# Patient Record
Sex: MALE | Race: BLACK OR AFRICAN AMERICAN | Employment: OTHER
[De-identification: names, ages, dates, MRNs, and addresses within clinical notes are randomized per-mention and may not be internally consistent; named-entity substitution may affect disease eponyms.]

---

## 2022-03-18 PROBLEM — R33.9 URINE RETENTION: Status: ACTIVE | Noted: 2019-03-08

## 2022-03-19 PROBLEM — N39.0 URINARY TRACT INFECTION WITHOUT HEMATURIA: Status: ACTIVE | Noted: 2020-01-02

## 2023-02-24 ENCOUNTER — HOSPITAL ENCOUNTER (EMERGENCY)
Facility: HOSPITAL | Age: 68
Discharge: HOME | End: 2023-02-24
Attending: EMERGENCY MEDICINE
Payer: MEDICAID

## 2023-02-24 VITALS
HEIGHT: 70 IN | RESPIRATION RATE: 18 BRPM | DIASTOLIC BLOOD PRESSURE: 78 MMHG | OXYGEN SATURATION: 96 % | SYSTOLIC BLOOD PRESSURE: 132 MMHG | HEART RATE: 72 BPM | WEIGHT: 160 LBS | BODY MASS INDEX: 22.9 KG/M2 | TEMPERATURE: 99.2 F

## 2023-02-24 DIAGNOSIS — R33.9 URINARY RETENTION: ICD-10-CM

## 2023-02-24 DIAGNOSIS — R31.0 GROSS HEMATURIA: Primary | ICD-10-CM

## 2023-02-24 PROCEDURE — 51702 INSERT TEMP BLADDER CATH: CPT | Performed by: EMERGENCY MEDICINE

## 2023-02-24 PROCEDURE — 99283 EMERGENCY DEPT VISIT LOW MDM: CPT

## 2023-02-24 NOTE — ED NOTES
EMERGENCY DEPARTMENT HISTORY AND PHYSICAL EXAM      Date: 2/24/2023  Patient Name: Toya Guerrero    History of Presenting Illness     No chief complaint on file. History Provided By: EMS    This is a 62-year-old male past medical history of urinary retention, hypertension, stroke presents to the ED with blood in his urinary catheter that was placed at a [place] [address] location. Per EMS patient was at the facility to have a urinary catheter inserted for urinary retention. EMS reports that staff communicated that there is resistance upon inserting the urinary catheter, resistance was pushed through and blood was noted to be within the urinary catheter. The facility then called EMS to have the patient transported to the ED. Patient is normally altered, he is alert and oriented to day and name. Patient denies any symptoms at this time, but ROS is unable to be performed due to patient's baseline AMS. The history is provided by the EMS personnel. PCP: No primary care provider on file. No current facility-administered medications for this encounter. No current outpatient medications on file.        Past History     Past Medical History:  Past Medical History:   Diagnosis Date    LORETTA (acute kidney injury) ([address]     Back pain     Bimalleolar fracture of right ankle 07/25/2018    CKD (chronic kidney disease)     Closed fracture of right ankle 11/26/2018    Essential hypertension 12/11/2015    Gastroesophageal reflux disease without esophagitis 12/11/2015    History of hemorrhagic stroke with residual hemiparesis ([address] 09/21/2014    Non-adherence to medical treatment     Non-adherence to medical treatment     Potassium (K) deficiency 11/26/2018    Sepsis ([address]     Urinary retention     Urinary tract infection without hematuria     Vision abnormalities      (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       Past Surgical History:  Past Surgical History:   Procedure Laterality Date    OTHER SURGICAL HISTORY  10/09/2014    INSERTION CPT: 51426;  Surgeon: Lucina Larose MD;  Location: [facility]; Service: Neurosurgery; Laterality: Right;  VENTRICULOPERITONEAL SHUNT INSERTION    OTHER SURGICAL HISTORY      Pr place percut gastrostomy    OTHER SURGICAL HISTORY Right 10/09/2014    Ventriculoperitoneal Shunt Insertion -- Dr Bambi Gale [facility])    OTHER SURGICAL HISTORY N/A 10/16/2014    Endoscopic Percutaneous Gastronomy Tube insertion -- Dr Lenard Rosario [facility])       Family History:  Family History   Problem Relation Age of Onset    Stroke Brother     Hypertension Mother     Stroke Mother     Diabetes Sister     Hypertension Sister     Diabetes Brother     Diabetes Mother     Hypertension Brother        Social History:  Social History     Tobacco Use    Smoking status: Former     Packs/day: 1.00     Types: Cigarettes    Smokeless tobacco: Never   Substance Use Topics    Alcohol use: Yes     Alcohol/week: 2.0 standard drinks    Drug use: No       Allergies:  Not on File      Review of Systems     Review of Systems   Reason unable to perform ROS: Baseline AMS. Physical Exam   There were no vitals taken for this visit. Physical Exam  Vitals and nursing note reviewed. Constitutional:       General: He is not in acute distress. Appearance: He is not ill-appearing, toxic-appearing or diaphoretic. HENT:      Head: Normocephalic and atraumatic. Nose: Nose normal. No congestion or rhinorrhea. Mouth/Throat:      Mouth: Mucous membranes are dry. Pharynx: Oropharynx is clear. Eyes:      General: No scleral icterus. Conjunctiva/sclera: Conjunctivae normal.      Pupils: Pupils are equal, round, and reactive to light. Neck:      Vascular: No carotid bruit. Cardiovascular:      Rate and Rhythm: Normal rate. Heart sounds: No murmur heard. Pulmonary:      Effort: Pulmonary effort is normal. No respiratory distress. Breath sounds: Normal breath sounds. No stridor.  No wheezing or rhonchi. Abdominal:      General: Abdomen is flat. There is no distension. Palpations: Abdomen is soft. Tenderness: There is no abdominal tenderness. There is no guarding or rebound. Genitourinary:     Comments: Urinary catheter is placed at the urethral opening which appears to be a hypospadias. There is blood around the urethral opening and within the patient's diaper. There is also gross blood noted within the urinary catheter bag. Musculoskeletal:      Cervical back: Normal range of motion. No tenderness. Comments: Right arm and right leg are fixed in flexion. Skin:     General: Skin is warm and dry. Capillary Refill: Capillary refill takes less than 2 seconds. Coloration: Skin is not pale. Neurological:      Mental Status: He is alert. Mental status is at baseline. Diagnostic Study Results     Labs -  No results found for this or any previous visit (from the past 12 hour(s)). Radiologic Studies -   No orders to display           Medical Decision Making   I am the first provider for this patient. I reviewed the vital signs, available nursing notes, past medical history, past surgical history, family history and social history. Vital Signs-Reviewed the patient's vital signs. ED Course: Progress Notes, Reevaluation, and Consults:    Provider Notes (Medical Decision Making):       MDM  Number of Diagnoses or Management Options  Diagnosis management comments: This is a 81-year-old male with past medical history of stroke, hypertension, altered mental status, urinary retention who presents to the ED after EMS picked him up from a [facility] procedural office that inserted a urinary catheter to treat his urinary retention.   EMS reports that there was resistance met when the catheter was inserted by faculty at this location and was pushed through however this resulted in gross blood being produced within the urinary catheter bag as well as around the urethral opening. On physical exam the patient is nontender to the anterior abdomen as well as the suprapubic region. He is noted to have a hypospadias with blood around the urinary catheter insertion site. Concern for traumatic insertion of urinary catheter. Ddx: Traumatic insertion of urinary catheter, UTI, hematuria of other source. Procedures          Diagnosis     Clinical Impression: No diagnosis found.            Jeniffer Hong  02/24/23 [id number]

## 2023-02-24 NOTE — ED TRIAGE NOTES
Patient came EMS d/t urinary cath issue    Had a friedman placed 30 min ago and was forced in per EMS.      12 fr in place and blood clots noted in bag

## 2023-02-24 NOTE — ED PROVIDER NOTES
EMERGENCY DEPARTMENT HISTORY AND PHYSICAL EXAM      Date: 2/24/2023  Patient Name: Azra Mari    History of Presenting Illness     Chief Complaint   Patient presents with    Urinary Catheter       History Provided By: EMS    This is a 52-year-old man who presents to the emergency department by EMS due to Malin catheter complication. He apparently was taken to an outpatient office to have a Malin catheter changed, when changing catheter, the staff apparently encountered resistance, push past it and then the patient started having blood from the catheter. It was a 12-gauge catheter they were using at the time. Patient has a history of urinary retention, chronic Malin use. There are no reports of recent fevers, patient is unable to provide much history due to dementia. The history is provided by the EMS personnel. PCP: None None    No current facility-administered medications for this encounter. No current outpatient medications on file. Past History     Past Medical History:  Past Medical History:   Diagnosis Date    LORETTA (acute kidney injury) ([address]     Back pain     Bimalleolar fracture of right ankle 07/25/2018    CKD (chronic kidney disease)     Closed fracture of right ankle 11/26/2018    Essential hypertension 12/11/2015    Gastroesophageal reflux disease without esophagitis 12/11/2015    History of hemorrhagic stroke with residual hemiparesis ([address] 09/21/2014    Non-adherence to medical treatment     Non-adherence to medical treatment     Potassium (K) deficiency 11/26/2018    Sepsis ([address]     Urinary retention     Urinary tract infection without hematuria     Vision abnormalities      (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       Past Surgical History:  Past Surgical History:   Procedure Laterality Date    OTHER SURGICAL HISTORY  10/09/2014    INSERTION CPT: 58379;  Surgeon: Michael Trimble MD;  Location: [facility]; Service: Neurosurgery;   Laterality: Right; VENTRICULOPERITONEAL SHUNT INSERTION    OTHER SURGICAL HISTORY      Pr place percut gastrostomy    OTHER SURGICAL HISTORY Right 10/09/2014    Ventriculoperitoneal Shunt Insertion -- Dr Liang Tavares [facility])    OTHER SURGICAL HISTORY N/A 10/16/2014    Endoscopic Percutaneous Gastronomy Tube insertion -- Dr Nicole Coronado [facility])       Family History:  Family History   Problem Relation Age of Onset    Stroke Brother     Hypertension Mother     Stroke Mother     Diabetes Sister     Hypertension Sister     Diabetes Brother     Diabetes Mother     Hypertension Brother        Social History:  Social History     Tobacco Use    Smoking status: Former     Packs/day: 1.00     Types: Cigarettes    Smokeless tobacco: Never   Substance Use Topics    Alcohol use: Yes     Alcohol/week: 2.0 standard drinks    Drug use: No       Allergies:  No Known Allergies      Review of Systems       Review of Systems   Constitutional:  Negative for chills, fatigue and fever. Genitourinary:  Positive for hematuria. Negative for dysuria and flank pain. Physical Exam   BP (!) 142/84   Pulse 72   Temp 99.2 °F (37.3 °C) (Oral)   Resp 18   Ht 5' 10\" (1.778 m)   Wt 160 lb (72.6 kg)   SpO2 99%   BMI 22.96 kg/m²       Physical Exam  Vitals and nursing note reviewed. Constitutional:       Appearance: He is normal weight. Cardiovascular:      Rate and Rhythm: Normal rate and regular rhythm. Pulmonary:      Effort: Pulmonary effort is normal.      Breath sounds: Normal breath sounds. Abdominal:      General: Bowel sounds are normal.      Palpations: Abdomen is soft. Genitourinary:     Comments: Patient has hypospadias secondary to catheter erosion, there is scant blood at the meatus. New catheter inserted with minimal difficulty, draining slightly blood-tinged urine. Neurological:      Mental Status: He is alert.          Diagnostic Study Results     Labs -  No results found for this or any previous visit (from the past 12 hour(s)). Radiologic Studies -   No orders to display           Medical Decision Making   I am the first provider for this patient. I reviewed the vital signs, available nursing notes, past medical history, past surgical history, family history and social history. Vital Signs-Reviewed the patient's vital signs. EKG:     ED Course: Progress Notes, Reevaluation, and Consults:    Provider Notes (Medical Decision Making):       MDM  Number of Diagnoses or Management Options  Gross hematuria  Urinary retention  Diagnosis management comments: 20-year-old man presenting to the emergency ferment with hematuria after a failed catheter insertion as an outpatient. We will replace his catheter here, monitor urine output, as long as he does not appear to be clotting, will discharge. Amount and/or Complexity of Data Reviewed  Clinical lab tests: ordered and reviewed  Discussion of test results with the performing providers: no  Obtain history from someone other than the patient: no  Discuss the patient with other providers: no    Risk of Complications, Morbidity, and/or Mortality  Presenting problems: low  Diagnostic procedures: low  Management options: low                 Procedures          Diagnosis     Clinical Impression:   1. Gross hematuria    2. Urinary retention        Disposition: home    @[facility]@     @[facility]@  Disclaimer: Sections of this note are dictated using utilizing voice recognition software. Minor typographical errors may be present. If questions arise, please do not hesitate to contact me or call our department.           Cindy Flores MD  02/24/23 0908

## 2023-03-24 PROBLEM — J96.01 ACUTE RESPIRATORY FAILURE WITH HYPOXEMIA (HCC): Status: ACTIVE | Noted: 2022-11-23

## 2023-03-24 PROBLEM — F33.41 RECURRENT MAJOR DEPRESSIVE DISORDER, IN PARTIAL REMISSION (HCC): Status: ACTIVE | Noted: 2022-04-20

## 2023-03-24 PROBLEM — K59.00 CONSTIPATION: Status: ACTIVE | Noted: 2019-04-03

## 2023-03-24 PROBLEM — N40.0 BPH (BENIGN PROSTATIC HYPERPLASIA): Status: ACTIVE | Noted: 2022-01-13

## 2023-03-24 PROBLEM — N30.00 ACUTE CYSTITIS WITHOUT HEMATURIA: Status: ACTIVE | Noted: 2018-09-13

## 2023-03-24 PROBLEM — M54.9 BACKACHE: Status: ACTIVE | Noted: 2017-01-18

## 2023-03-24 PROBLEM — I69.351 HEMIPARESIS AFFECTING RIGHT SIDE AS LATE EFFECT OF STROKE (HCC): Status: ACTIVE | Noted: 2022-04-20

## 2023-03-24 PROBLEM — J30.89 ENVIRONMENTAL AND SEASONAL ALLERGIES: Status: ACTIVE | Noted: 2019-04-03

## 2023-03-24 PROBLEM — G89.29 CHRONIC NECK PAIN: Status: ACTIVE | Noted: 2019-04-03

## 2023-03-24 PROBLEM — N17.8 OTHER ACUTE KIDNEY FAILURE (HCC): Status: ACTIVE | Noted: 2022-11-23

## 2023-03-24 PROBLEM — N17.9 AKI (ACUTE KIDNEY INJURY) (HCC): Status: ACTIVE | Noted: 2022-11-24

## 2023-03-24 PROBLEM — E78.49 OTHER HYPERLIPIDEMIA: Status: ACTIVE | Noted: 2022-01-13

## 2023-03-24 PROBLEM — E87.29 HIGH ANION GAP METABOLIC ACIDOSIS: Status: ACTIVE | Noted: 2022-11-24

## 2023-03-24 PROBLEM — A41.9 SEPTIC SHOCK (HCC): Status: ACTIVE | Noted: 2022-11-23

## 2023-03-24 PROBLEM — R65.21 SEPTIC SHOCK (HCC): Status: ACTIVE | Noted: 2022-11-23

## 2023-03-24 PROBLEM — R73.03 PREDIABETES: Status: ACTIVE | Noted: 2019-04-12

## 2023-03-24 PROBLEM — G47.9 SLEEP DIFFICULTIES: Status: ACTIVE | Noted: 2018-11-26

## 2023-03-24 PROBLEM — D72.9 NEUTROPHILIC LEUKOCYTOSIS: Status: ACTIVE | Noted: 2022-11-23

## 2023-03-24 PROBLEM — M54.2 CHRONIC NECK PAIN: Status: ACTIVE | Noted: 2019-04-03

## 2024-06-12 ENCOUNTER — APPOINTMENT (OUTPATIENT)
Facility: HOSPITAL | Age: 69
DRG: 698 | End: 2024-06-12
Payer: MEDICARE

## 2024-06-12 ENCOUNTER — HOSPITAL ENCOUNTER (INPATIENT)
Facility: HOSPITAL | Age: 69
LOS: 9 days | Discharge: NURSING, REHAB OR LONG-TERM CARE | DRG: 698 | End: 2024-06-21
Attending: STUDENT IN AN ORGANIZED HEALTH CARE EDUCATION/TRAINING PROGRAM | Admitting: INTERNAL MEDICINE
Payer: MEDICARE

## 2024-06-12 DIAGNOSIS — A41.9 SEPTIC SHOCK (HCC): Primary | ICD-10-CM

## 2024-06-12 DIAGNOSIS — K92.2 UPPER GI BLEED: ICD-10-CM

## 2024-06-12 DIAGNOSIS — R41.82 ALTERED MENTAL STATUS, UNSPECIFIED ALTERED MENTAL STATUS TYPE: ICD-10-CM

## 2024-06-12 DIAGNOSIS — R65.21 SEPTIC SHOCK (HCC): Primary | ICD-10-CM

## 2024-06-12 DIAGNOSIS — E87.20 LACTIC ACIDEMIA: ICD-10-CM

## 2024-06-12 LAB
ALBUMIN SERPL-MCNC: 2.5 G/DL (ref 3.4–5)
ALBUMIN/GLOB SERPL: 0.5 (ref 0.8–1.7)
ALP SERPL-CCNC: 54 U/L (ref 45–117)
ALT SERPL-CCNC: 11 U/L (ref 16–61)
AMPHET UR QL SCN: NEGATIVE
ANION GAP SERPL CALC-SCNC: 15 MMOL/L (ref 3–18)
APPEARANCE UR: ABNORMAL
AST SERPL-CCNC: 10 U/L (ref 10–38)
BACTERIA URNS QL MICRO: ABNORMAL /HPF
BARBITURATES UR QL SCN: NEGATIVE
BENZODIAZ UR QL: NEGATIVE
BILIRUB SERPL-MCNC: 0.2 MG/DL (ref 0.2–1)
BILIRUB UR QL: NEGATIVE
BUN SERPL-MCNC: 99 MG/DL (ref 7–18)
BUN/CREAT SERPL: 45 (ref 12–20)
CALCIUM SERPL-MCNC: 8.3 MG/DL (ref 8.5–10.1)
CANNABINOIDS UR QL SCN: NEGATIVE
CHLORIDE SERPL-SCNC: 110 MMOL/L (ref 100–111)
CK SERPL-CCNC: 33 U/L (ref 39–308)
CO2 SERPL-SCNC: 18 MMOL/L (ref 21–32)
COCAINE UR QL SCN: NEGATIVE
COLOR UR: YELLOW
CREAT SERPL-MCNC: 2.21 MG/DL (ref 0.6–1.3)
EPITH CASTS URNS QL MICRO: ABNORMAL /LPF (ref 0–5)
ERYTHROCYTE [DISTWIDTH] IN BLOOD BY AUTOMATED COUNT: 14.7 % (ref 11.6–14.5)
ETHANOL SERPL-MCNC: <3 MG/DL (ref 0–3)
FLUAV RNA SPEC QL NAA+PROBE: NOT DETECTED
FLUBV RNA SPEC QL NAA+PROBE: NOT DETECTED
GLOBULIN SER CALC-MCNC: 4.6 G/DL (ref 2–4)
GLUCOSE BLD STRIP.AUTO-MCNC: 192 MG/DL (ref 70–110)
GLUCOSE SERPL-MCNC: 184 MG/DL (ref 74–99)
GLUCOSE UR STRIP.AUTO-MCNC: NEGATIVE MG/DL
HCT VFR BLD AUTO: 15.7 % (ref 36–48)
HCT VFR BLD AUTO: 16.9 % (ref 36–48)
HEMOCCULT STL QL: POSITIVE
HGB BLD-MCNC: 5 G/DL (ref 13–16)
HGB BLD-MCNC: 5.8 G/DL (ref 13–16)
HGB UR QL STRIP: ABNORMAL
HISTORY CHECK: NORMAL
HISTORY CHECK: NORMAL
KETONES UR QL STRIP.AUTO: ABNORMAL MG/DL
LACTATE SERPL-SCNC: 0.8 MMOL/L (ref 0.4–2)
LACTATE SERPL-SCNC: 6.1 MMOL/L (ref 0.4–2)
LEUKOCYTE ESTERASE UR QL STRIP.AUTO: ABNORMAL
Lab: NORMAL
MCH RBC QN AUTO: 28.1 PG (ref 24–34)
MCHC RBC AUTO-ENTMCNC: 31.8 G/DL (ref 31–37)
MCV RBC AUTO: 88.2 FL (ref 78–100)
METHADONE UR QL: NEGATIVE
NITRITE UR QL STRIP.AUTO: NEGATIVE
NRBC # BLD: 0 K/UL (ref 0–0.01)
NRBC BLD-RTO: 0 PER 100 WBC
OPIATES UR QL: NEGATIVE
PCP UR QL: NEGATIVE
PH UR STRIP: 5 (ref 5–8)
PLATELET # BLD AUTO: 416 K/UL (ref 135–420)
PMV BLD AUTO: 10.2 FL (ref 9.2–11.8)
POTASSIUM SERPL-SCNC: 4.1 MMOL/L (ref 3.5–5.5)
PROCALCITONIN SERPL-MCNC: 1.17 NG/ML
PROT SERPL-MCNC: 7.1 G/DL (ref 6.4–8.2)
PROT UR STRIP-MCNC: 100 MG/DL
RBC # BLD AUTO: 1.78 M/UL (ref 4.35–5.65)
RBC #/AREA URNS HPF: ABNORMAL /HPF (ref 0–5)
SARS-COV-2 RNA RESP QL NAA+PROBE: NOT DETECTED
SODIUM SERPL-SCNC: 143 MMOL/L (ref 136–145)
SP GR UR REFRACTOMETRY: 1.02 (ref 1–1.03)
TROPONIN I SERPL HS-MCNC: 8 NG/L (ref 0–78)
TSH SERPL DL<=0.05 MIU/L-ACNC: 3.35 UIU/ML (ref 0.36–3.74)
UROBILINOGEN UR QL STRIP.AUTO: 1 EU/DL (ref 0.2–1)
WBC # BLD AUTO: 19.6 K/UL (ref 4.6–13.2)
WBC URNS QL MICRO: ABNORMAL /HPF (ref 0–4)
YEAST URNS QL MICRO: ABNORMAL

## 2024-06-12 PROCEDURE — 84145 PROCALCITONIN (PCT): CPT

## 2024-06-12 PROCEDURE — 02HV33Z INSERTION OF INFUSION DEVICE INTO SUPERIOR VENA CAVA, PERCUTANEOUS APPROACH: ICD-10-PCS | Performed by: STUDENT IN AN ORGANIZED HEALTH CARE EDUCATION/TRAINING PROGRAM

## 2024-06-12 PROCEDURE — 2500000003 HC RX 250 WO HCPCS: Performed by: REGISTERED NURSE

## 2024-06-12 PROCEDURE — 96366 THER/PROPH/DIAG IV INF ADDON: CPT

## 2024-06-12 PROCEDURE — 84443 ASSAY THYROID STIM HORMONE: CPT

## 2024-06-12 PROCEDURE — 85027 COMPLETE CBC AUTOMATED: CPT

## 2024-06-12 PROCEDURE — P9016 RBC LEUKOCYTES REDUCED: HCPCS

## 2024-06-12 PROCEDURE — 86850 RBC ANTIBODY SCREEN: CPT

## 2024-06-12 PROCEDURE — 2580000003 HC RX 258: Performed by: PHYSICIAN ASSISTANT

## 2024-06-12 PROCEDURE — 82962 GLUCOSE BLOOD TEST: CPT

## 2024-06-12 PROCEDURE — 82550 ASSAY OF CK (CPK): CPT

## 2024-06-12 PROCEDURE — 81001 URINALYSIS AUTO W/SCOPE: CPT

## 2024-06-12 PROCEDURE — 71045 X-RAY EXAM CHEST 1 VIEW: CPT

## 2024-06-12 PROCEDURE — 94761 N-INVAS EAR/PLS OXIMETRY MLT: CPT

## 2024-06-12 PROCEDURE — 85014 HEMATOCRIT: CPT

## 2024-06-12 PROCEDURE — P9047 ALBUMIN (HUMAN), 25%, 50ML: HCPCS | Performed by: REGISTERED NURSE

## 2024-06-12 PROCEDURE — 87086 URINE CULTURE/COLONY COUNT: CPT

## 2024-06-12 PROCEDURE — 85018 HEMOGLOBIN: CPT

## 2024-06-12 PROCEDURE — 70450 CT HEAD/BRAIN W/O DYE: CPT

## 2024-06-12 PROCEDURE — 82077 ASSAY SPEC XCP UR&BREATH IA: CPT

## 2024-06-12 PROCEDURE — 6360000002 HC RX W HCPCS: Performed by: STUDENT IN AN ORGANIZED HEALTH CARE EDUCATION/TRAINING PROGRAM

## 2024-06-12 PROCEDURE — 84484 ASSAY OF TROPONIN QUANT: CPT

## 2024-06-12 PROCEDURE — 87636 SARSCOV2 & INF A&B AMP PRB: CPT

## 2024-06-12 PROCEDURE — 86901 BLOOD TYPING SEROLOGIC RH(D): CPT

## 2024-06-12 PROCEDURE — 96375 TX/PRO/DX INJ NEW DRUG ADDON: CPT

## 2024-06-12 PROCEDURE — 86900 BLOOD TYPING SEROLOGIC ABO: CPT

## 2024-06-12 PROCEDURE — 87186 SC STD MICRODIL/AGAR DIL: CPT

## 2024-06-12 PROCEDURE — 2000000000 HC ICU R&B

## 2024-06-12 PROCEDURE — 96365 THER/PROPH/DIAG IV INF INIT: CPT

## 2024-06-12 PROCEDURE — 2700000000 HC OXYGEN THERAPY PER DAY

## 2024-06-12 PROCEDURE — 51702 INSERT TEMP BLADDER CATH: CPT

## 2024-06-12 PROCEDURE — 96361 HYDRATE IV INFUSION ADD-ON: CPT

## 2024-06-12 PROCEDURE — 80307 DRUG TEST PRSMV CHEM ANLYZR: CPT

## 2024-06-12 PROCEDURE — 87088 URINE BACTERIA CULTURE: CPT

## 2024-06-12 PROCEDURE — 74176 CT ABD & PELVIS W/O CONTRAST: CPT

## 2024-06-12 PROCEDURE — 6360000002 HC RX W HCPCS: Performed by: PHYSICIAN ASSISTANT

## 2024-06-12 PROCEDURE — 99285 EMERGENCY DEPT VISIT HI MDM: CPT

## 2024-06-12 PROCEDURE — 99291 CRITICAL CARE FIRST HOUR: CPT | Performed by: INTERNAL MEDICINE

## 2024-06-12 PROCEDURE — 6360000002 HC RX W HCPCS: Performed by: REGISTERED NURSE

## 2024-06-12 PROCEDURE — 83605 ASSAY OF LACTIC ACID: CPT

## 2024-06-12 PROCEDURE — 2580000003 HC RX 258: Performed by: STUDENT IN AN ORGANIZED HEALTH CARE EDUCATION/TRAINING PROGRAM

## 2024-06-12 PROCEDURE — 2500000003 HC RX 250 WO HCPCS: Performed by: STUDENT IN AN ORGANIZED HEALTH CARE EDUCATION/TRAINING PROGRAM

## 2024-06-12 PROCEDURE — C9113 INJ PANTOPRAZOLE SODIUM, VIA: HCPCS | Performed by: STUDENT IN AN ORGANIZED HEALTH CARE EDUCATION/TRAINING PROGRAM

## 2024-06-12 PROCEDURE — 3E033XZ INTRODUCTION OF VASOPRESSOR INTO PERIPHERAL VEIN, PERCUTANEOUS APPROACH: ICD-10-PCS | Performed by: STUDENT IN AN ORGANIZED HEALTH CARE EDUCATION/TRAINING PROGRAM

## 2024-06-12 PROCEDURE — 99291 CRITICAL CARE FIRST HOUR: CPT | Performed by: REGISTERED NURSE

## 2024-06-12 PROCEDURE — 82272 OCCULT BLD FECES 1-3 TESTS: CPT

## 2024-06-12 PROCEDURE — 86923 COMPATIBILITY TEST ELECTRIC: CPT

## 2024-06-12 PROCEDURE — 87040 BLOOD CULTURE FOR BACTERIA: CPT

## 2024-06-12 PROCEDURE — 80053 COMPREHEN METABOLIC PANEL: CPT

## 2024-06-12 RX ORDER — ONDANSETRON 4 MG/1
4 TABLET, ORALLY DISINTEGRATING ORAL EVERY 8 HOURS PRN
Status: DISCONTINUED | OUTPATIENT
Start: 2024-06-12 | End: 2024-06-21 | Stop reason: HOSPADM

## 2024-06-12 RX ORDER — SODIUM CHLORIDE, SODIUM LACTATE, POTASSIUM CHLORIDE, AND CALCIUM CHLORIDE .6; .31; .03; .02 G/100ML; G/100ML; G/100ML; G/100ML
1000 INJECTION, SOLUTION INTRAVENOUS ONCE
Status: COMPLETED | OUTPATIENT
Start: 2024-06-12 | End: 2024-06-12

## 2024-06-12 RX ORDER — OCTREOTIDE ACETATE 50 UG/ML
50 INJECTION, SOLUTION INTRAVENOUS; SUBCUTANEOUS ONCE
Status: COMPLETED | OUTPATIENT
Start: 2024-06-12 | End: 2024-06-12

## 2024-06-12 RX ORDER — ACETAMINOPHEN 325 MG/1
650 TABLET ORAL EVERY 6 HOURS PRN
Status: DISCONTINUED | OUTPATIENT
Start: 2024-06-12 | End: 2024-06-21 | Stop reason: HOSPADM

## 2024-06-12 RX ORDER — POLYETHYLENE GLYCOL 3350 17 G/17G
17 POWDER, FOR SOLUTION ORAL DAILY PRN
Status: DISCONTINUED | OUTPATIENT
Start: 2024-06-12 | End: 2024-06-12

## 2024-06-12 RX ORDER — MAGNESIUM SULFATE IN WATER 40 MG/ML
2000 INJECTION, SOLUTION INTRAVENOUS PRN
Status: DISCONTINUED | OUTPATIENT
Start: 2024-06-12 | End: 2024-06-21 | Stop reason: HOSPADM

## 2024-06-12 RX ORDER — ACETAMINOPHEN 650 MG/1
650 SUPPOSITORY RECTAL EVERY 6 HOURS PRN
Status: DISCONTINUED | OUTPATIENT
Start: 2024-06-12 | End: 2024-06-21 | Stop reason: HOSPADM

## 2024-06-12 RX ORDER — SODIUM CHLORIDE 9 MG/ML
INJECTION, SOLUTION INTRAVENOUS PRN
Status: DISCONTINUED | OUTPATIENT
Start: 2024-06-12 | End: 2024-06-21 | Stop reason: HOSPADM

## 2024-06-12 RX ORDER — ALBUMIN (HUMAN) 12.5 G/50ML
25 SOLUTION INTRAVENOUS EVERY 6 HOURS
Status: COMPLETED | OUTPATIENT
Start: 2024-06-12 | End: 2024-06-13

## 2024-06-12 RX ORDER — ONDANSETRON 2 MG/ML
4 INJECTION INTRAMUSCULAR; INTRAVENOUS EVERY 6 HOURS PRN
Status: DISCONTINUED | OUTPATIENT
Start: 2024-06-12 | End: 2024-06-21 | Stop reason: HOSPADM

## 2024-06-12 RX ORDER — 0.9 % SODIUM CHLORIDE 0.9 %
500 INTRAVENOUS SOLUTION INTRAVENOUS ONCE
Status: COMPLETED | OUTPATIENT
Start: 2024-06-12 | End: 2024-06-12

## 2024-06-12 RX ORDER — SODIUM CHLORIDE 0.9 % (FLUSH) 0.9 %
5-40 SYRINGE (ML) INJECTION EVERY 12 HOURS SCHEDULED
Status: DISCONTINUED | OUTPATIENT
Start: 2024-06-12 | End: 2024-06-21 | Stop reason: HOSPADM

## 2024-06-12 RX ORDER — SODIUM CHLORIDE 0.9 % (FLUSH) 0.9 %
5-40 SYRINGE (ML) INJECTION PRN
Status: DISCONTINUED | OUTPATIENT
Start: 2024-06-12 | End: 2024-06-21 | Stop reason: HOSPADM

## 2024-06-12 RX ORDER — SODIUM CHLORIDE 9 MG/ML
INJECTION, SOLUTION INTRAVENOUS PRN
Status: DISCONTINUED | OUTPATIENT
Start: 2024-06-12 | End: 2024-06-17

## 2024-06-12 RX ORDER — CALCIUM GLUCONATE 20 MG/ML
1000 INJECTION, SOLUTION INTRAVENOUS
Status: COMPLETED | OUTPATIENT
Start: 2024-06-12 | End: 2024-06-13

## 2024-06-12 RX ORDER — POTASSIUM CHLORIDE 20 MEQ/1
40 TABLET, EXTENDED RELEASE ORAL PRN
Status: DISCONTINUED | OUTPATIENT
Start: 2024-06-12 | End: 2024-06-21 | Stop reason: HOSPADM

## 2024-06-12 RX ORDER — POTASSIUM CHLORIDE 7.45 MG/ML
10 INJECTION INTRAVENOUS PRN
Status: DISCONTINUED | OUTPATIENT
Start: 2024-06-12 | End: 2024-06-21 | Stop reason: HOSPADM

## 2024-06-12 RX ORDER — NOREPINEPHRINE BITARTRATE 0.06 MG/ML
1-100 INJECTION, SOLUTION INTRAVENOUS CONTINUOUS
Status: DISCONTINUED | OUTPATIENT
Start: 2024-06-12 | End: 2024-06-15

## 2024-06-12 RX ORDER — SODIUM CHLORIDE 9 MG/ML
INJECTION, SOLUTION INTRAVENOUS PRN
Status: DISCONTINUED | OUTPATIENT
Start: 2024-06-12 | End: 2024-06-15

## 2024-06-12 RX ADMIN — VANCOMYCIN HYDROCHLORIDE 1750 MG: 10 INJECTION, POWDER, LYOPHILIZED, FOR SOLUTION INTRAVENOUS at 14:37

## 2024-06-12 RX ADMIN — ALBUMIN (HUMAN) 25 G: 0.25 INJECTION, SOLUTION INTRAVENOUS at 22:32

## 2024-06-12 RX ADMIN — CEFEPIME 2000 MG: 2 INJECTION, POWDER, FOR SOLUTION INTRAVENOUS at 12:59

## 2024-06-12 RX ADMIN — Medication 5 MCG/MIN: at 18:26

## 2024-06-12 RX ADMIN — OCTREOTIDE ACETATE 50 MCG: 50 INJECTION, SOLUTION INTRAVENOUS; SUBCUTANEOUS at 17:30

## 2024-06-12 RX ADMIN — PANTOPRAZOLE SODIUM 80 MG: 40 INJECTION, POWDER, FOR SOLUTION INTRAVENOUS at 17:30

## 2024-06-12 RX ADMIN — Medication 11 MCG/MIN: at 19:55

## 2024-06-12 RX ADMIN — PIPERACILLIN AND TAZOBACTAM 4500 MG: 4; .5 INJECTION, POWDER, FOR SOLUTION INTRAVENOUS at 20:15

## 2024-06-12 RX ADMIN — Medication 7 MCG/MIN: at 19:18

## 2024-06-12 RX ADMIN — CALCIUM GLUCONATE 1000 MG: 20 INJECTION, SOLUTION INTRAVENOUS at 22:27

## 2024-06-12 RX ADMIN — SODIUM CHLORIDE, POTASSIUM CHLORIDE, SODIUM LACTATE AND CALCIUM CHLORIDE 1000 ML: 600; 310; 30; 20 INJECTION, SOLUTION INTRAVENOUS at 13:09

## 2024-06-12 RX ADMIN — SODIUM CHLORIDE 500 ML: 9 INJECTION, SOLUTION INTRAVENOUS at 17:26

## 2024-06-12 RX ADMIN — SODIUM CHLORIDE, POTASSIUM CHLORIDE, SODIUM LACTATE AND CALCIUM CHLORIDE 1000 ML: 600; 310; 30; 20 INJECTION, SOLUTION INTRAVENOUS at 11:59

## 2024-06-12 ASSESSMENT — PAIN SCALES - GENERAL: PAINLEVEL_OUTOF10: 0

## 2024-06-12 NOTE — ED NOTES
Daughter present at bedside. Dr. Sanchez gave update to the daughter.    Daughter signed blood transfusion consent in behalf of the pt.

## 2024-06-12 NOTE — ED TRIAGE NOTES
Pt presents to ed bed 4 with c/o ams. Ems stated pt presented to adult  center more altered than normal. Stated pt normally talks but today was non-verbal. Pt has hx of dimentia, ckd, & jennifer     Pt presents as tachycardic and hypotensive     Bg via ems 236

## 2024-06-12 NOTE — FLOWSHEET NOTE
06/12/24 1801   Document PRIOR to Transfusion   Blood Bank Wristband ID # [id number]   Previous Transfusion Reaction Unknown   Pre-Meds Given No   Hospital Blood Product Consent Form Signed Yes   Vitals   BP (!) 72/42   Temp 99 °F (37.2 °C)   Temp Source Oral     Blood transfusion started at 1801

## 2024-06-12 NOTE — ED PROVIDER NOTES
EMERGENCY DEPARTMENT HISTORY AND PHYSICAL EXAM    2:26 PM      Date: 6/12/2024  Patient Name: Alycia Clement    History of Presenting Illness     Chief Complaint   Patient presents with    Altered Mental Status       History From: EMS and patient's daughter    Alycia Clement is a 69 y.o. male   HPI  69-year old male with history of urinary retention, hydrocephalus, CVA with mild right-sided deficits who presents with concern for altered mental status.  Unknown last known well.  Per EMS unknown last well.  Today patient is less responsive than baseline, increased work of breathing, was found to be hypotensive.  Spoke with daughter.  States that patient was in the hospital 2 weeks ago.  Unknown last known well.  Patient is able to have minimal conversations due to history of stroke.    Nursing Notes were all reviewed and agreed with or any disagreements were addressed in the HPI.    PCP: None, None    Current Facility-Administered Medications   Medication Dose Route Frequency Provider Last Rate Last Admin    0.9 % sodium chloride infusion   IntraVENous PRN Juany Ignacio PA-C        pantoprazole (PROTONIX) 40 mg in sodium chloride 0.9 % 50 mL (mini-bag) infusion  8 mg/hr IntraVENous Continuous Asah Hernandez T, DO 10 mL/hr at 06/18/24 0905 8 mg/hr at 06/18/24 0905    piperacillin-tazobactam (ZOSYN) 3,375 mg in dextrose 5 % 50 mL IVPB  3,375 mg IntraVENous Q8H Asah Hernandez T, DO 12.5 mL/hr at 06/18/24 1202 3,375 mg at 06/18/24 1202    glucose chewable tablet 16 g  4 tablet Oral PRN PedlowskaZenaida, PA-C        dextrose bolus 10% 125 mL  125 mL IntraVENous PRN Pedlowska, Zenaida, PA-C        Or    dextrose bolus 10% 250 mL  250 mL IntraVENous PRN Pedlowska, Zenaida, PA-C        Glucagon Emergency SOLR 1 mg  1 mg SubCUTAneous PRN Pedlowska, Zenaida, PA-C        dextrose 10 % infusion   IntraVENous Continuous PRN Pedlowska, Zenaida, PA-C        insulin lispro (HUMALOG,ADMELOG) injection vial 0-4 Units  0-4 Units SubCUTAneous Q6H

## 2024-06-12 NOTE — CONSULTS
[email or web address]  [phone]    GASTROENTEROLOGY CONSULT          Impression:     Alycia Clement is a 69 y.o. male w/ PMH CVA w/ R sided deficits, BPH w/ indwelling friedman, h/o  shunt, CKD, GERD presented to the ED 6/12 with AMS. Unknown last well. Hypotensive and tachycardic in ED w/ Hgb of 5.0 (baseline 8-10), PLT nml, Cr 2.21, lactic acid 6.1, LFT nml, FOB positive, and black stools. No evidence of liver disease on 4 CT in last 2-3 yrs. Patient was admitted to [facility] last month for septic shock w/ associated acute encephalopathy.     11/2022 EGD - non-bleeding gastric ulcer w/ no signs of recent bleed, duodenitis; gastric bx: reactive gastropathy, gastric ulcer: focally denuded       Low suspicion for variceal bleed at this time w/ nml PLT and imaging w/ nml liver in past.    Plan:     1. NPO after midnight. Pending clinical course and tomorrow's team consider EGD if indicated.   2. IV BID PPI  3. Monitor H/H, transfuse if Hgb <7.  4. Continue medical management per primary.     Chief Complaint: acute anemia       HPI:    Alycia Clement is a 69 y.o. male w/ PMH CVA w/ R sided deficits, BPH w/ indwelling friedman, h/o  shunt, CKD, GERD presented to the ED 6/12 with AMS. Unknown last well. Hypotensive and tachycardic in ED w/ Hgb of 5.0 (baseline 8-10), PLT nml, Cr 2.21, lactic acid 6.1, LFT nml, FOB positive, and black stools. No evidence of liver disease on 4 CT in last 2-3 yrs. Patient was admitted to [facility] last month for septic shock w/ associated acute encephalopathy.     11/2022 EGD - non-bleeding gastric ulcer w/ no signs of recent bleed, duodenitis; gastric bx: reactive gastropathy, gastric ulcer: focally denuded         PMH:   Past Medical History:   Diagnosis Date    LORETTA (acute kidney injury) (HCC)     Back pain     Bimalleolar fracture of right ankle 07/25/2018    CKD (chronic kidney disease)     Closed fracture of right ankle 11/26/2018    Essential hypertension 12/11/2015    Gastroesophageal reflux disease  Labs     06/12/24  1150   WBC 19.6*   RBC 1.78*   HGB 5.0*   HCT 15.7*   MCV 88.2   MCH 28.1   MCHC 31.8   RDW 14.7*      MPV 10.2    No results for input(s): \"MONO\", \"PRO\", \"METAS\" in the last 72 hours.    Invalid input(s): \"GRANS\", \"LYMPH\", \"EOS\", \"BASO\", \"MYELO\", \"BLAST\"     Hepatic Function   No results for input(s): \"TP\" in the last 72 hours.    Invalid input(s): \"ALB\", \"DBILI\", \"TBILI\", \"GPT\", \"SGOT\", \"AP\", \"AML\", \"LPSE\"     Coags   No results for input(s): \"INR\", \"APTT\" in the last 72 hours.    Invalid input(s): \"PTP\"        Vikram Stone PA-C.   06/12/24, 5:19 PM   [facility]  [email or web address]  Phone: [phone]

## 2024-06-12 NOTE — ED NOTES
Pt came to ED with friedman. Removed old friedman.    BRITANY Boss inserted Fr16 friedman aseptically and connected to UB.

## 2024-06-12 NOTE — PROGRESS NOTES
Pharmacy Note     Zosyn 3375 mg Every 8 hours ordered for treatment of Sepsis of Unknown Etiology for 7 days. Per [facility] Policy, Zosyn will be changed to Zosyn 4500 mg ONCE followed by 3375 mg Every 8 hours.     Estimated Creatinine Clearance: Estimated Creatinine Clearance: 33 mL/min (A) (based on SCr of 2.21 mg/dL (H)).  Dialysis Status, LORETTA, CKD: LORETTA    BMI:  Body mass index is 24.67 kg/m².    Rationale for Adjustment:  [facility] B-Lactam extended infusion policy    Pharmacy will continue to monitor and adjust dose as necessary.      Please call with any questions.    Thank you,  Tex Naik RPH

## 2024-06-12 NOTE — FLOWSHEET NOTE
06/12/24 1351   Document PRIOR to Transfusion   Blood Bank Wristband ID # [id number]   Previous Transfusion Reaction Unknown   Pre-Meds Given No   Hospital Blood Product Consent Form Signed Yes   Vitals   BP (!) 120/55   Temp 97.6 °F (36.4 °C)   Temp Source Oral   SpO2 100 %     Blood transfusion started

## 2024-06-12 NOTE — CONSENT
Informed Consent for Blood Component Transfusion Note    I have discussed with the daughter the rationale for blood component transfusion; its benefits in treating or preventing fatigue, organ damage, or death; and its risk which includes mild transfusion reactions, rare risk of blood borne infection, or more serious but rare reactions. I have discussed the alternatives to transfusion, including the risk and consequences of not receiving transfusion. The daughter had an opportunity to ask questions and had agreed to proceed with transfusion of blood components.    Electronically signed by Gaston Sanchez MD on 6/12/24 at 3:40 PM EDT

## 2024-06-13 ENCOUNTER — APPOINTMENT (OUTPATIENT)
Facility: HOSPITAL | Age: 69
DRG: 698 | End: 2024-06-13
Payer: MEDICARE

## 2024-06-13 PROBLEM — J96.01 ACUTE RESPIRATORY FAILURE WITH HYPOXIA (HCC): Status: ACTIVE | Noted: 2024-06-13

## 2024-06-13 PROBLEM — Z51.5 ENCOUNTER FOR PALLIATIVE CARE: Status: ACTIVE | Noted: 2024-06-13

## 2024-06-13 PROBLEM — G93.40 ACUTE ENCEPHALOPATHY: Status: ACTIVE | Noted: 2024-06-13

## 2024-06-13 PROBLEM — R53.81 DEBILITY: Status: ACTIVE | Noted: 2024-06-13

## 2024-06-13 PROBLEM — Z71.89 GOALS OF CARE, COUNSELING/DISCUSSION: Status: ACTIVE | Noted: 2024-06-13

## 2024-06-13 PROBLEM — N39.0 SEPTIC SHOCK DUE TO URINARY TRACT INFECTION (HCC): Status: ACTIVE | Noted: 2024-06-13

## 2024-06-13 PROBLEM — A41.9 SEPTIC SHOCK DUE TO URINARY TRACT INFECTION (HCC): Status: ACTIVE | Noted: 2024-06-13

## 2024-06-13 PROBLEM — Z92.89 HISTORY OF CREATION OF VENTRICULOPERITONEAL SHUNT: Status: ACTIVE | Noted: 2024-06-13

## 2024-06-13 PROBLEM — I69.30 HISTORY OF CVA WITH RESIDUAL DEFICIT: Status: ACTIVE | Noted: 2024-06-13

## 2024-06-13 PROBLEM — R57.8 HEMORRHAGIC SHOCK (HCC): Status: ACTIVE | Noted: 2024-06-13

## 2024-06-13 PROBLEM — D62 ACUTE BLOOD LOSS ANEMIA: Status: ACTIVE | Noted: 2024-06-13

## 2024-06-13 PROBLEM — R65.21 SEPTIC SHOCK DUE TO URINARY TRACT INFECTION (HCC): Status: ACTIVE | Noted: 2024-06-13

## 2024-06-13 LAB
ANION GAP BLD CALC-SCNC: ABNORMAL MMOL/L (ref 10–20)
ANION GAP BLD CALC-SCNC: ABNORMAL MMOL/L (ref 10–20)
ANION GAP SERPL CALC-SCNC: 8 MMOL/L (ref 3–18)
ARTERIAL PATENCY WRIST A: ABNORMAL
ARTERIAL PATENCY WRIST A: POSITIVE
BASE DEFICIT BLD-SCNC: 8.8 MMOL/L
BASE DEFICIT BLD-SCNC: 9.2 MMOL/L
BASOPHILS # BLD: 0 K/UL (ref 0–0.1)
BASOPHILS NFR BLD: 0 % (ref 0–2)
BDY SITE: ABNORMAL
BDY SITE: ABNORMAL
BUN SERPL-MCNC: 100 MG/DL (ref 7–18)
BUN/CREAT SERPL: 53 (ref 12–20)
CA-I BLD-MCNC: 1.11 MMOL/L (ref 1.12–1.32)
CA-I BLD-MCNC: 1.12 MMOL/L (ref 1.12–1.32)
CA-I SERPL-SCNC: 1.06 MMOL/L (ref 1.15–1.33)
CALCIUM SERPL-MCNC: 8.4 MG/DL (ref 8.5–10.1)
CHLORIDE BLD-SCNC: 119 MMOL/L (ref 98–107)
CHLORIDE BLD-SCNC: 121 MMOL/L (ref 98–107)
CHLORIDE SERPL-SCNC: 117 MMOL/L (ref 100–111)
CO2 BLD-SCNC: 17 MMOL/L (ref 19–24)
CO2 BLD-SCNC: 17 MMOL/L (ref 19–24)
CO2 SERPL-SCNC: 20 MMOL/L (ref 21–32)
CREAT BLD-MCNC: 1.76 MG/DL (ref 0.6–1.3)
CREAT BLD-MCNC: 1.95 MG/DL (ref 0.6–1.3)
CREAT SERPL-MCNC: 1.9 MG/DL (ref 0.6–1.3)
DIFFERENTIAL METHOD BLD: ABNORMAL
ECHO AR MAX VEL PISA: 4.4 M/S
ECHO AV MEAN GRADIENT: 4 MMHG
ECHO AV MEAN VELOCITY: 0.9 M/S
ECHO AV PEAK GRADIENT: 6 MMHG
ECHO AV PEAK VELOCITY: 1.3 M/S
ECHO AV REGURGITANT PHT: 925.6 MILLISECOND
ECHO AV VELOCITY RATIO: 0.77
ECHO AV VTI: 12.5 CM
ECHO BSA: 1.96 M2
ECHO LV E' LATERAL VELOCITY: 7 CM/S
ECHO LV E' SEPTAL VELOCITY: 6 CM/S
ECHO LV FRACTIONAL SHORTENING: 27 % (ref 28–44)
ECHO LV INTERNAL DIMENSION DIASTOLE INDEX: 1.33 CM/M2
ECHO LV INTERNAL DIMENSION DIASTOLIC: 2.6 CM (ref 4.2–5.9)
ECHO LV INTERNAL DIMENSION SYSTOLIC INDEX: 0.97 CM/M2
ECHO LV INTERNAL DIMENSION SYSTOLIC: 1.9 CM
ECHO LV IVSD: 1.5 CM (ref 0.6–1)
ECHO LV MASS 2D: 132.1 G (ref 88–224)
ECHO LV MASS INDEX 2D: 67.7 G/M2 (ref 49–115)
ECHO LV POSTERIOR WALL DIASTOLIC: 1.5 CM (ref 0.6–1)
ECHO LV RELATIVE WALL THICKNESS RATIO: 1.15
ECHO LVOT AV VTI INDEX: 0.98
ECHO LVOT MEAN GRADIENT: 2 MMHG
ECHO LVOT PEAK GRADIENT: 4 MMHG
ECHO LVOT PEAK VELOCITY: 1 M/S
ECHO LVOT VTI: 12.2 CM
ECHO MV A VELOCITY: 0.93 M/S
ECHO MV AREA PHT: 6.8 CM2
ECHO MV E DECELERATION TIME (DT): 107.8 MS
ECHO MV E VELOCITY: 0.58 M/S
ECHO MV E/A RATIO: 0.62
ECHO MV E/E' LATERAL: 8.29
ECHO MV E/E' RATIO (AVERAGED): 8.98
ECHO MV E/E' SEPTAL: 9.67
ECHO MV LVOT VTI INDEX: 1.89
ECHO MV MAX VELOCITY: 1.1 M/S
ECHO MV MEAN GRADIENT: 2 MMHG
ECHO MV MEAN VELOCITY: 0.7 M/S
ECHO MV PEAK GRADIENT: 5 MMHG
ECHO MV PRESSURE HALF TIME (PHT): 32.5 MS
ECHO MV VTI: 23 CM
ECHO PV MAX VELOCITY: 1 M/S
ECHO PV PEAK GRADIENT: 4 MMHG
ECHO RV TAPSE: 2.5 CM (ref 1.7–?)
ECHO RVOT PEAK GRADIENT: 4 MMHG
ECHO RVOT PEAK VELOCITY: 1 M/S
EKG ATRIAL RATE: 112 BPM
EKG DIAGNOSIS: NORMAL
EKG P AXIS: 51 DEGREES
EKG P-R INTERVAL: 130 MS
EKG Q-T INTERVAL: 356 MS
EKG QRS DURATION: 108 MS
EKG QTC CALCULATION (BAZETT): 485 MS
EKG R AXIS: -68 DEGREES
EKG T AXIS: 64 DEGREES
EKG VENTRICULAR RATE: 112 BPM
EOSINOPHIL # BLD: 0 K/UL (ref 0–0.4)
EOSINOPHIL NFR BLD: 0 % (ref 0–5)
ERYTHROCYTE [DISTWIDTH] IN BLOOD BY AUTOMATED COUNT: 14.6 % (ref 11.6–14.5)
FIBRINOGEN PPP-MCNC: 187 MG/DL (ref 210–451)
FIO2 ON VENT: 100 %
FIO2 ON VENT: 100 %
GAS FLOW.O2 O2 DELIVERY SYS: ABNORMAL
GAS FLOW.O2 O2 DELIVERY SYS: ABNORMAL
GLUCOSE BLD STRIP.AUTO-MCNC: 206 MG/DL (ref 70–110)
GLUCOSE BLD-MCNC: 246 MG/DL (ref 70–110)
GLUCOSE BLD-MCNC: 254 MG/DL (ref 70–110)
GLUCOSE SERPL-MCNC: 152 MG/DL (ref 74–99)
HCO3 BLD-SCNC: 16.8 MMOL/L (ref 22–26)
HCO3 BLD-SCNC: 17.3 MMOL/L (ref 22–26)
HCT VFR BLD AUTO: 18.6 % (ref 36–48)
HCT VFR BLD AUTO: 19.8 % (ref 36–48)
HCT VFR BLD AUTO: 22.4 % (ref 36–48)
HCT VFR BLD AUTO: 25.2 % (ref 36–48)
HCT VFR BLD AUTO: 26.1 % (ref 36–48)
HGB BLD-MCNC: 6.2 G/DL (ref 13–16)
HGB BLD-MCNC: 6.6 G/DL (ref 13–16)
HGB BLD-MCNC: 7.5 G/DL (ref 13–16)
HGB BLD-MCNC: 8.5 G/DL (ref 13–16)
HGB BLD-MCNC: 8.8 G/DL (ref 13–16)
HISTORY CHECK: NORMAL
IMM GRANULOCYTES # BLD AUTO: 0.2 K/UL (ref 0–0.04)
IMM GRANULOCYTES NFR BLD AUTO: 1 % (ref 0–0.5)
INR PPP: 1.4 (ref 0.9–1.1)
INSPIRATION.DURATION SETTING TIME VENT: 0.9 SEC
INSPIRATION.DURATION SETTING TIME VENT: 9 SEC
IPAP/PIP: 23
IPAP/PIP: 28
LACTATE BLD-SCNC: 1.31 MMOL/L (ref 0.4–2)
LACTATE BLD-SCNC: 2.2 MMOL/L (ref 0.4–2)
LACTATE SERPL-SCNC: 1 MMOL/L (ref 0.4–2)
LACTATE SERPL-SCNC: 1.3 MMOL/L (ref 0.4–2)
LYMPHOCYTES # BLD: 2.5 K/UL (ref 0.9–3.6)
LYMPHOCYTES NFR BLD: 14 % (ref 21–52)
MAGNESIUM SERPL-MCNC: 1.5 MG/DL (ref 1.6–2.6)
MCH RBC QN AUTO: 29.6 PG (ref 24–34)
MCHC RBC AUTO-ENTMCNC: 33.3 G/DL (ref 31–37)
MCV RBC AUTO: 88.8 FL (ref 78–100)
MONOCYTES # BLD: 1.3 K/UL (ref 0.05–1.2)
MONOCYTES NFR BLD: 7 % (ref 3–10)
NEUTS SEG # BLD: 14.3 K/UL (ref 1.8–8)
NEUTS SEG NFR BLD: 78 % (ref 40–73)
NRBC # BLD: 0 K/UL (ref 0–0.01)
NRBC BLD-RTO: 0 PER 100 WBC
PAW @ MEAN EXP FLOW ON VENT: 12 CMH2O
PAW @ MEAN EXP FLOW ON VENT: 15 CMH2O
PCO2 BLDV: 34.2 MMHG (ref 41–51)
PCO2 BLDV: 39.3 MMHG (ref 41–51)
PEEP RESPIRATORY: 8
PEEP RESPIRATORY: 8
PH BLDV: 7.25 (ref 7.32–7.42)
PH BLDV: 7.3 (ref 7.32–7.42)
PHOSPHATE SERPL-MCNC: 4.3 MG/DL (ref 2.5–4.9)
PLATELET # BLD AUTO: 232 K/UL (ref 135–420)
PMV BLD AUTO: 10.1 FL (ref 9.2–11.8)
PO2 BLDV: 38 MMHG (ref 25–40)
PO2 BLDV: 420 MMHG (ref 25–40)
POTASSIUM BLD-SCNC: 4 MMOL/L (ref 3.5–5.1)
POTASSIUM BLD-SCNC: 4.1 MMOL/L (ref 3.5–5.1)
POTASSIUM SERPL-SCNC: 4.2 MMOL/L (ref 3.5–5.5)
PROTHROMBIN TIME: 17.4 SEC (ref 11.9–14.9)
RBC # BLD AUTO: 2.23 M/UL (ref 4.35–5.65)
SAO2 % BLD: 100 %
SAO2 % BLD: 63 %
SERVICE CMNT-IMP: ABNORMAL
SERVICE CMNT-IMP: ABNORMAL
SODIUM BLD-SCNC: 148 MMOL/L (ref 136–145)
SODIUM BLD-SCNC: 149 MMOL/L (ref 136–145)
SODIUM SERPL-SCNC: 145 MMOL/L (ref 136–145)
SPECIMEN SITE: ABNORMAL
SPECIMEN SITE: ABNORMAL
VANCOMYCIN SERPL-MCNC: 17.3 UG/ML (ref 5–40)
VENTILATION MODE VENT: ABNORMAL
VENTILATION MODE VENT: ABNORMAL
VT SETTING VENT: 500
VT SETTING VENT: 500
WBC # BLD AUTO: 18.3 K/UL (ref 4.6–13.2)

## 2024-06-13 PROCEDURE — 99291 CRITICAL CARE FIRST HOUR: CPT | Performed by: INTERNAL MEDICINE

## 2024-06-13 PROCEDURE — 82947 ASSAY GLUCOSE BLOOD QUANT: CPT

## 2024-06-13 PROCEDURE — 36430 TRANSFUSION BLD/BLD COMPNT: CPT

## 2024-06-13 PROCEDURE — 94761 N-INVAS EAR/PLS OXIMETRY MLT: CPT

## 2024-06-13 PROCEDURE — 2500000003 HC RX 250 WO HCPCS: Performed by: REGISTERED NURSE

## 2024-06-13 PROCEDURE — 6360000002 HC RX W HCPCS: Performed by: PHYSICIAN ASSISTANT

## 2024-06-13 PROCEDURE — 71045 X-RAY EXAM CHEST 1 VIEW: CPT

## 2024-06-13 PROCEDURE — 6360000002 HC RX W HCPCS: Performed by: INTERNAL MEDICINE

## 2024-06-13 PROCEDURE — APPSS30 APP SPLIT SHARED TIME 16-30 MINUTES: Performed by: REGISTERED NURSE

## 2024-06-13 PROCEDURE — 83735 ASSAY OF MAGNESIUM: CPT

## 2024-06-13 PROCEDURE — 83605 ASSAY OF LACTIC ACID: CPT

## 2024-06-13 PROCEDURE — C9113 INJ PANTOPRAZOLE SODIUM, VIA: HCPCS | Performed by: PHYSICIAN ASSISTANT

## 2024-06-13 PROCEDURE — 84100 ASSAY OF PHOSPHORUS: CPT

## 2024-06-13 PROCEDURE — 31500 INSERT EMERGENCY AIRWAY: CPT

## 2024-06-13 PROCEDURE — 2500000003 HC RX 250 WO HCPCS: Performed by: STUDENT IN AN ORGANIZED HEALTH CARE EDUCATION/TRAINING PROGRAM

## 2024-06-13 PROCEDURE — 84295 ASSAY OF SERUM SODIUM: CPT

## 2024-06-13 PROCEDURE — 2700000000 HC OXYGEN THERAPY PER DAY

## 2024-06-13 PROCEDURE — 93306 TTE W/DOPPLER COMPLETE: CPT | Performed by: INTERNAL MEDICINE

## 2024-06-13 PROCEDURE — 80048 BASIC METABOLIC PNL TOTAL CA: CPT

## 2024-06-13 PROCEDURE — 04HL33Z INSERTION OF INFUSION DEVICE INTO LEFT FEMORAL ARTERY, PERCUTANEOUS APPROACH: ICD-10-PCS | Performed by: PHYSICIAN ASSISTANT

## 2024-06-13 PROCEDURE — 85025 COMPLETE CBC W/AUTO DIFF WBC: CPT

## 2024-06-13 PROCEDURE — 84132 ASSAY OF SERUM POTASSIUM: CPT

## 2024-06-13 PROCEDURE — 2500000003 HC RX 250 WO HCPCS: Performed by: PHYSICIAN ASSISTANT

## 2024-06-13 PROCEDURE — 85610 PROTHROMBIN TIME: CPT

## 2024-06-13 PROCEDURE — 37799 UNLISTED PX VASCULAR SURGERY: CPT

## 2024-06-13 PROCEDURE — 0BH17EZ INSERTION OF ENDOTRACHEAL AIRWAY INTO TRACHEA, VIA NATURAL OR ARTIFICIAL OPENING: ICD-10-PCS | Performed by: PHYSICIAN ASSISTANT

## 2024-06-13 PROCEDURE — 36620 INSERTION CATHETER ARTERY: CPT

## 2024-06-13 PROCEDURE — 2580000003 HC RX 258: Performed by: PHYSICIAN ASSISTANT

## 2024-06-13 PROCEDURE — 93010 ELECTROCARDIOGRAM REPORT: CPT | Performed by: INTERNAL MEDICINE

## 2024-06-13 PROCEDURE — P9059 PLASMA, FRZ BETWEEN 8-24HOUR: HCPCS

## 2024-06-13 PROCEDURE — 82803 BLOOD GASES ANY COMBINATION: CPT

## 2024-06-13 PROCEDURE — 5A1955Z RESPIRATORY VENTILATION, GREATER THAN 96 CONSECUTIVE HOURS: ICD-10-PCS | Performed by: STUDENT IN AN ORGANIZED HEALTH CARE EDUCATION/TRAINING PROGRAM

## 2024-06-13 PROCEDURE — 94002 VENT MGMT INPAT INIT DAY: CPT

## 2024-06-13 PROCEDURE — 85014 HEMATOCRIT: CPT

## 2024-06-13 PROCEDURE — 85018 HEMOGLOBIN: CPT

## 2024-06-13 PROCEDURE — 93306 TTE W/DOPPLER COMPLETE: CPT

## 2024-06-13 PROCEDURE — 80202 ASSAY OF VANCOMYCIN: CPT

## 2024-06-13 PROCEDURE — P9047 ALBUMIN (HUMAN), 25%, 50ML: HCPCS | Performed by: REGISTERED NURSE

## 2024-06-13 PROCEDURE — 2000000000 HC ICU R&B

## 2024-06-13 PROCEDURE — 99222 1ST HOSP IP/OBS MODERATE 55: CPT

## 2024-06-13 PROCEDURE — 93005 ELECTROCARDIOGRAM TRACING: CPT | Performed by: STUDENT IN AN ORGANIZED HEALTH CARE EDUCATION/TRAINING PROGRAM

## 2024-06-13 PROCEDURE — 6360000002 HC RX W HCPCS: Performed by: REGISTERED NURSE

## 2024-06-13 PROCEDURE — 6360000002 HC RX W HCPCS

## 2024-06-13 PROCEDURE — 82962 GLUCOSE BLOOD TEST: CPT

## 2024-06-13 PROCEDURE — 85384 FIBRINOGEN ACTIVITY: CPT

## 2024-06-13 PROCEDURE — P9016 RBC LEUKOCYTES REDUCED: HCPCS

## 2024-06-13 PROCEDURE — 31500 INSERT EMERGENCY AIRWAY: CPT | Performed by: INTERNAL MEDICINE

## 2024-06-13 PROCEDURE — 36600 WITHDRAWAL OF ARTERIAL BLOOD: CPT

## 2024-06-13 PROCEDURE — 82330 ASSAY OF CALCIUM: CPT

## 2024-06-13 RX ORDER — PHENYLEPHRINE HCL IN 0.9% NACL 50MG/250ML
10-300 PLASTIC BAG, INJECTION (ML) INTRAVENOUS CONTINUOUS
Status: DISCONTINUED | OUTPATIENT
Start: 2024-06-13 | End: 2024-06-13

## 2024-06-13 RX ORDER — SODIUM CHLORIDE 9 MG/ML
INJECTION, SOLUTION INTRAVENOUS PRN
Status: DISCONTINUED | OUTPATIENT
Start: 2024-06-13 | End: 2024-06-15

## 2024-06-13 RX ORDER — MAGNESIUM SULFATE IN WATER 40 MG/ML
4000 INJECTION, SOLUTION INTRAVENOUS ONCE
Status: COMPLETED | OUTPATIENT
Start: 2024-06-13 | End: 2024-06-13

## 2024-06-13 RX ORDER — MIDAZOLAM HYDROCHLORIDE 2 MG/2ML
2 INJECTION, SOLUTION INTRAMUSCULAR; INTRAVENOUS ONCE
Status: COMPLETED | OUTPATIENT
Start: 2024-06-13 | End: 2024-06-13

## 2024-06-13 RX ORDER — FENTANYL CITRATE 50 UG/ML
INJECTION, SOLUTION INTRAMUSCULAR; INTRAVENOUS
Status: COMPLETED
Start: 2024-06-13 | End: 2024-06-13

## 2024-06-13 RX ORDER — FENTANYL CITRATE 50 UG/ML
100 INJECTION, SOLUTION INTRAMUSCULAR; INTRAVENOUS ONCE
Status: COMPLETED | OUTPATIENT
Start: 2024-06-13 | End: 2024-06-13

## 2024-06-13 RX ORDER — PHENYLEPHRINE HCL IN 0.9% NACL 50MG/250ML
PLASTIC BAG, INJECTION (ML) INTRAVENOUS
Status: COMPLETED
Start: 2024-06-13 | End: 2024-06-13

## 2024-06-13 RX ORDER — CALCIUM GLUCONATE 20 MG/ML
1000 INJECTION, SOLUTION INTRAVENOUS ONCE
Status: COMPLETED | OUTPATIENT
Start: 2024-06-13 | End: 2024-06-13

## 2024-06-13 RX ORDER — VANCOMYCIN 1.75 G/350ML
1250 INJECTION, SOLUTION INTRAVENOUS ONCE
Status: COMPLETED | OUTPATIENT
Start: 2024-06-13 | End: 2024-06-13

## 2024-06-13 RX ORDER — FENTANYL CITRATE 50 UG/ML
100 INJECTION, SOLUTION INTRAMUSCULAR; INTRAVENOUS
Status: DISCONTINUED | OUTPATIENT
Start: 2024-06-13 | End: 2024-06-17

## 2024-06-13 RX ORDER — CALCIUM GLUCONATE 20 MG/ML
1000 INJECTION, SOLUTION INTRAVENOUS
Status: COMPLETED | OUTPATIENT
Start: 2024-06-14 | End: 2024-06-14

## 2024-06-13 RX ORDER — MIDAZOLAM HYDROCHLORIDE 2 MG/2ML
2 INJECTION, SOLUTION INTRAMUSCULAR; INTRAVENOUS
Status: DISCONTINUED | OUTPATIENT
Start: 2024-06-13 | End: 2024-06-17

## 2024-06-13 RX ORDER — SODIUM CHLORIDE 9 MG/ML
INJECTION, SOLUTION INTRAVENOUS PRN
Status: DISCONTINUED | OUTPATIENT
Start: 2024-06-13 | End: 2024-06-21 | Stop reason: HOSPADM

## 2024-06-13 RX ORDER — FENTANYL CITRATE-0.9 % NACL/PF 10 MCG/ML
25-200 PLASTIC BAG, INJECTION (ML) INTRAVENOUS CONTINUOUS
Status: DISCONTINUED | OUTPATIENT
Start: 2024-06-13 | End: 2024-06-15

## 2024-06-13 RX ADMIN — ALBUMIN (HUMAN) 25 G: 0.25 INJECTION, SOLUTION INTRAVENOUS at 08:13

## 2024-06-13 RX ADMIN — ALBUMIN (HUMAN) 25 G: 0.25 INJECTION, SOLUTION INTRAVENOUS at 03:37

## 2024-06-13 RX ADMIN — VASOPRESSIN 0.03 UNITS/MIN: 20 INJECTION INTRAVENOUS at 13:43

## 2024-06-13 RX ADMIN — PIPERACILLIN AND TAZOBACTAM 3375 MG: 3; .375 INJECTION, POWDER, FOR SOLUTION INTRAVENOUS at 01:05

## 2024-06-13 RX ADMIN — SODIUM CHLORIDE, PRESERVATIVE FREE 10 ML: 5 INJECTION INTRAVENOUS at 08:15

## 2024-06-13 RX ADMIN — MIDAZOLAM 2 MG: 1 INJECTION INTRAMUSCULAR; INTRAVENOUS at 10:30

## 2024-06-13 RX ADMIN — PANTOPRAZOLE SODIUM 40 MG: 40 INJECTION, POWDER, LYOPHILIZED, FOR SOLUTION INTRAVENOUS at 14:43

## 2024-06-13 RX ADMIN — HYDROCORTISONE SODIUM SUCCINATE 100 MG: 100 INJECTION, POWDER, FOR SOLUTION INTRAMUSCULAR; INTRAVENOUS at 17:48

## 2024-06-13 RX ADMIN — PIPERACILLIN AND TAZOBACTAM 3375 MG: 3; .375 INJECTION, POWDER, FOR SOLUTION INTRAVENOUS at 17:00

## 2024-06-13 RX ADMIN — VANCOMYCIN 1250 MG: 1.75 INJECTION, SOLUTION INTRAVENOUS at 09:00

## 2024-06-13 RX ADMIN — Medication 35 MCG/MIN: at 23:34

## 2024-06-13 RX ADMIN — Medication 50 MCG/MIN: at 15:36

## 2024-06-13 RX ADMIN — PIPERACILLIN AND TAZOBACTAM 3375 MG: 3; .375 INJECTION, POWDER, FOR SOLUTION INTRAVENOUS at 09:00

## 2024-06-13 RX ADMIN — OCTREOTIDE ACETATE 25 MCG/HR: 500 INJECTION, SOLUTION INTRAVENOUS; SUBCUTANEOUS at 11:31

## 2024-06-13 RX ADMIN — ALBUMIN (HUMAN) 25 G: 0.25 INJECTION, SOLUTION INTRAVENOUS at 14:38

## 2024-06-13 RX ADMIN — FENTANYL CITRATE 50 MCG/HR: at 10:01

## 2024-06-13 RX ADMIN — CALCIUM GLUCONATE 1000 MG: 20 INJECTION, SOLUTION INTRAVENOUS at 00:40

## 2024-06-13 RX ADMIN — MAGNESIUM SULFATE HEPTAHYDRATE 4000 MG: 4 INJECTION, SOLUTION INTRAVENOUS at 05:47

## 2024-06-13 RX ADMIN — Medication 100 MCG/MIN: at 09:27

## 2024-06-13 RX ADMIN — FENTANYL CITRATE 100 MCG: 50 INJECTION INTRAMUSCULAR; INTRAVENOUS at 10:01

## 2024-06-13 RX ADMIN — CALCIUM GLUCONATE 1000 MG: 20 INJECTION, SOLUTION INTRAVENOUS at 11:39

## 2024-06-13 RX ADMIN — Medication 60 MCG/MIN: at 10:20

## 2024-06-13 RX ADMIN — FENTANYL CITRATE: 50 INJECTION INTRAMUSCULAR; INTRAVENOUS at 10:01

## 2024-06-13 RX ADMIN — HYDROCORTISONE SODIUM SUCCINATE 100 MG: 100 INJECTION, POWDER, FOR SOLUTION INTRAMUSCULAR; INTRAVENOUS at 11:37

## 2024-06-13 RX ADMIN — PANTOPRAZOLE SODIUM 40 MG: 40 INJECTION, POWDER, LYOPHILIZED, FOR SOLUTION INTRAVENOUS at 03:37

## 2024-06-13 RX ADMIN — SODIUM CHLORIDE, PRESERVATIVE FREE 10 ML: 5 INJECTION INTRAVENOUS at 23:16

## 2024-06-13 RX ADMIN — FENTANYL CITRATE 75 MCG/HR: at 21:10

## 2024-06-13 ASSESSMENT — PULMONARY FUNCTION TESTS
PIF_VALUE: 26
PIF_VALUE: 25
PIF_VALUE: 24

## 2024-06-13 NOTE — ED NOTES
Assumed care of pt resting cardiac monitor in place respirations even and unlabored , pt hypotensive Levophed increased to 11mcg/min. Hx of dementia  indwelling cath draining to dependent drainage bag. Yanna care performed hosea tarry tools noted. Blood transfusing at 125ml/hr

## 2024-06-13 NOTE — H&P
[facility]  Pulmonary, Critical Care, and Sleep Medicine    Name: Alycia Clement MRN: [id number]   : 1955 Hospital: [facility]   Date: 2024        Critical Care History and Physical      IMPRESSION:   Septic shock- secondary to UTI. Might also have a component of hemorrhagic shock but no source of blood loss at this point.  Complicated UTI- recent discharge from [facility] for septic shock related to UTI. Chronic indwelling Malin for urinary retention. Multiple admission for UTI. Blood cx- Enterobacter , Urine cx- Providencia and morganella from last admission. Received Vanc, Zosyn, and Cefepime.  Acute blood loss anemia- no specific source at this time but possibly lower GI. EGD on 2022 showed non-non-bleeding gastric ulcer w/ no signs of recent bleed, duodenitis. Report of black tarry stool with positive FOB per GI.   Acute encephalopathy- toxic-metabolic secondary to sepsis. Presented with AMS, now improved  LORETTA on CKD3- Crea 2.2, was 1.1 (). Pre-renal vs ATN  NGMA- likely from lactic acidosis  BPH with urinary retention- chronic indwelling Malin, changed in the ER  Hx of Left thalamic intracranial hemorrhage with intraventricular hemorrhage s/p  shunt ()- baseline right side hemiparesis and facial droop     Patient Active Problem List   Diagnosis    Urine retention    Urinary tract infection without hematuria    Acute cystitis without hematuria    Acute respiratory failure with hypoxemia (HCC)    LORETTA (acute kidney injury) (HCC)    Anemia    Backache    BPH (benign prostatic hyperplasia)    Chronic neck pain    Constipation    Dementia (HCC)    Environmental and seasonal allergies    Essential hypertension    Gastroesophageal reflux disease without esophagitis    Hemiparesis affecting right side as late effect of stroke (HCC)    High anion gap metabolic acidosis    Hydrocephalus, communicating (HCC)    Neutrophilic leukocytosis    Other acute kidney failure  Medicine  [facility]

## 2024-06-13 NOTE — PROGRESS NOTES
Pulmonary, Critical Care, and Sleep Medicine    Name: Alycia Clement MRN: [id number]   : 1955 Hospital: [facility]   Date: 2024        Intubation Note    Procedure: Emergent intubation    Indication: respiratory insufficiency    Anesthesia- Rapid sequence: Etomidate 20 mg  Paralysis: Rocuronium 80 mg    After assessing the airway, the patient underwent preoxygenation with 100% FiO2 for 5 min. Etomidate and rocuronium was given sequentially in rapid IV push.  The Sellick maneuver was performed throughout the entire sequence.  After adequate sedation and paralysis emergent intubation was performed. Pt's head and neck remained extremely stiff.       A S4 glidescope was used to visualize the epiglottis and vocal chords.     After positive identification of the vocal chords, an 7.5 ET tube was placed into the trachea with direct visualization.    The tube was seen passing through the vocal chords with some difficulty.  CO2 colorimetry was employed immediately to verify tube in airway with /without appropriate color change indicating detection of CO2.    Water vapor was seen within the ET tube, and auscultation of the abdomen revealed no bubbling souds.    Auscultation  and inspection of the chest after intubation showed symmetric chest excursion and symmetric air entry bilaterally.    Chest X-ray has been ordered and is pending.    The patient has been placed on a mechanical ventilator.    There were no complications.    Juany Ignacio PA-C   Supervised by Dr. Ortega

## 2024-06-13 NOTE — ACP (ADVANCE CARE PLANNING)
Advance Care Planning     Palliative Team Advance Care Planning (ACP) Conversation    Date of Conversation: 06/13/24    Individuals present for the conversation: Patient. NO family are present at bedside during first bedside visit this AM.       ACP documents on file prior to discussion: Patient has a POST in the EMR for FULL INTERVENTIONS AND TO ATTEMPT RESUSCITATION signed by his daughterJade has a General Power of  in the EMR dated 3/16/2015. IT does NOT assign miner to his \" in fact\" to make health care decisions. Patient's healthcare agents will be his adult children.        Previously completed document/s not on file: Not discussed.    Healthcare Decision Maker: Unless other AMD is located, Patient's healthcare agents will be his adult children.        Conversation Summary: Mr. Alycia Clement is a 68 y/o male with a PMHx of prior hemorrhagic CVA w/ Rt hemiparesis, CKD with chronic indwelling friedman cath and dementia. Patient was admitted from his adult  center where he became more altered with determined to have low BP, increased HR and in septic shock with GI bleed. Patient required  IV fluid bolus and then vasopressin for BP support. H&H was 5.0/15.7 with 2 units of pRBC.  This AM Patient required intubation with vent support for airway protection. Vent setting of 40% FiO2 and PEEP 8.   Palliative Medicine team members Lydia Nixon NP and this writer for consult visit.  Patient was not alert, maintained on fentanyl gtt, and Phenylephrine.  Chart documents reviewed and noted above. DaughterJade is the main point person. Per ICU staff daughter works nights and will be coming to the hospital this afternoon. Palliative team will plan contact with family during hospital visit.     UPDATE- Palliative Medicine team met with Patient's daughter at bedside. Ms Jade Vasquez at bedside. The role of palliative as extra support as healthcare decisions have to be

## 2024-06-13 NOTE — CONSULTS
Palliative Medicine Initial Consult  Patient Name: Alycia Clement  YOB: 1955  MRN: [id number]  Age: 69 y.o.  Gender: male    Date of Initial Consult: 6/13/2024  Date of Service: 6/13/2024  Time: 3:46 PM  Provider: GILES Ruiz  Hospital Day: 2  Admit Date: 6/12/2024  Referring Provider: Juany RICHTER       Reasons for Consultation:  Goals of Care    HISTORY OF PRESENT ILLNESS (HPI):   Alycia Clement is a 69 y.o. male with a past medical history of hemorrhagic stroke with right hemiparesis,  shunt placement in 2014, hypertension, chronic kidney disease, chronic friedman catheter, sepsis related to urinary tract infections, who was admitted on 6/12/2024 from [facility] with a diagnosis of septic shock related to UTI.  The patient was at his adult  of which he attends twice weekly. He resides at [facility]. He was \"more altered than normal\" per notes.  Per chart review the patient was recently discharged from [facility] in May 2024 for severe sepsis and septic shock related to a UTI.  Palliative care was consulted for goals of care.    Psychosocial: The patient lives at [facility].  He is a patient of the [facility] program and attends adult  about twice a week per daughter.  He is not , he has 2 daughters and 1 son.  He has a sister named Margarita Clement.  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    6/13/2024 Patient seen in ICU. Daughter was at the bedside.  He opened his eyes but then quickly closed them.  Bedside nurse was in the room.  Patient intubated, on multiple vasopressor support and sedated with IV fentanyl.       PALLIATIVE DIAGNOSES:    Goals of care  Encounter for palliative care  Septic shock secondary to UTI  Acute Encephalopathy  Acute blood loss anemia  History of CVA with residual right hemiparesis and facial droop  History of

## 2024-06-13 NOTE — PROGRESS NOTES
[facility]   Pharmacy Pharmacokinetic Monitoring Service - Vancomycin    Indication: sepsis  Goal level: 10-20 mg/L  Day of Therapy: 2  Additional Antimicrobials: pip-tazo    Pertinent Laboratory Values:   Wt Readings from Last 1 Encounters:   06/12/24 78 kg (171 lb 14.4 oz)     Temp Readings from Last 1 Encounters:   06/13/24 98.1 °F (36.7 °C)     Estimated Creatinine Clearance: 38 mL/min (A) (based on SCr of 1.9 mg/dL (H)).    Recent Labs     06/12/24  1150 06/13/24  0349   CREATININE 2.21* 1.90*   BUN 99* 100*   WBC 19.6* 18.3*     Pertinent Cultures:  Date Source Results   6/12 blood NGTD   6/12 urine pending   MRSA Nasal Swab: NA    Assessment:  Date Current Dose Level (mg/L) Timing of Level (h)   6/12 1,750 mg x1 - -   6/13 1,250 mg x1 17.3 13     Plan:  Dose by level  Dose: 1,250 mg x1  Ordered a level for 6/14 with AM labs  Pharmacy will continue to monitor patient and adjust therapy as indicated    Thank you for the consult,  Tono Smiley [facility]  6/13/2024

## 2024-06-13 NOTE — PROGRESS NOTES
PCCM Update:    Pt seen and evaluated this morning and appeared hemodynamically stable on levophed 12mcg. As the morning progressed, pt acutely decompensated - increased work of breathing, O2 sats in the 60s on room air, became tachycardic with HR in the 130s-140s and hypotensive with SBP in the 60s with escalating pressor requirements. I spoke with GI urgently who stated plan is for EGD tomorrow however they would come evaluate the patient. Additional 2 units of PRBCs and 1 FFP ordered. Bright red blood noted on patient's tongue. Octreotide gtt also initiated. Case discussed with Dr. Ortega, given worsening shock and need for EGD - pt intubated also for airway protection. Patient's daughter, Jade Vasquez, was notified of his critical condition.    Juany Ignacio PA-C  06/13/24  Pulmonary, Critical Care Medicine  [facility]

## 2024-06-13 NOTE — PROGRESS NOTES
[email or web address]  [phone]    Gastroenterology follow up-Progress note    Impression:  1. Septic Shock - complicated UTI w/ hx chronic indwelling friedman  - recent hospitalization [facility] last month for same  2. Acute on chronic anemia -   - H/H - 5.0/15.7 on arrival, improved to 6.6/19.8 s/p 3 PRBC  - past history of PUD w/ EGD 11/2022 w/ non-bleeding gastric ulcer w/ no signs of recent bleed, duodenitis, gastric bx: reactive gastropathy  gastric ulcer: focally denuded mucosa with chronic inflammation  - CT 6/12/24 w/o contrast shows probable fecal impaction  3. Melena w/ FOB+   4. Hx CVA w/ chronic R sided deficits, s/p  Shunt 2014  5. Acute metabolic/toxic encephalopathy  6. LORETTA on CKD3        Plan:  1. Continue IV PPI BID  2. Monitor H/H, transfuse per protocol  3. Continue medical management per primary team  4. Recommend additional day to stabilize anemia and septic/hemorrhagic shock and will plan for EGD tomorrow - posted. Will re-evaluate through day today to determine appropriate timing of procedure.   5. NPO      Chief Complaint: acute anemia      Subjective:  Pt seen w/ ICU team at bedside for emergent intubation for respiratory insufficiency. Pt tachycardic and hypotensive. Pressor requirements have increased. + melenic stools    ROS: unable to obtain      General: ill appearing, pale  Eyes: sclera anicteric  Cardiovascular: tachycardic  Pulmonary: intubation being performed  Abdominal: appearance normal    Patient Active Problem List   Diagnosis    Urine retention    Urinary tract infection without hematuria    Acute cystitis without hematuria    Acute respiratory failure with hypoxemia (HCC)    LORETTA (acute kidney injury) (HCC)    Anemia    Backache    BPH (benign prostatic hyperplasia)    Chronic neck pain    Constipation    Dementia (HCC)    Environmental and seasonal allergies    Essential hypertension    Gastroesophageal reflux disease without esophagitis    Hemiparesis affecting right side as

## 2024-06-13 NOTE — PROGRESS NOTES
[facility].  Pulmonary, Critical Care, and Sleep Medicine    Name: Alycia Clement MRN: [id number]   : 1955 Hospital: [facility]   Date: 2024  Admission Date: 2024     Chart and notes reviewed. Data reviewed. I have evaluated all findings.    [x]I have reviewed the flowsheet and previous day’s notes.    []The patient is unable to give any meaningful history or review of systems because the patient is:  []Intubated []Sedated   []Unresponsive      []The patient is critically ill on      []Mechanical ventilation []Pressors   []BiPAP []         Interval HPI:  Patient is a 69 y.o. male with PMHx of previous CVA with residual right hemiparesis and aphasia, BPH with indwelling Friedman catheter, history of  shunt, CKD, GERD, multiple admission for UTI, brought in by EMS from nursing facility due to altered mental status, unknown last known well. Pt tachycardic, hypotensive, and short of breath on presentation. Work-up significant for leukocytosis, WBC 19k, lactic acidosis, anemia with Hgb of 5, LORETTA, hyperglycemia. UA positive for LE, pyuria, bacteruria. CXR negative. CTH negative for acute process. Pt refractory to 2 L IVF so started on Levophed and right fe CVL inserted. Pt also receiving 2 units PRBC. GI was consulted with plans to perform EGD in the morning pending clinical course. Pt will be admitted to our service.   Subjective 24  Hospital Day:1   Vent Day:n/a  Overnight events:Admitted for septic and hemorrhagic shock. Multiple blood products  Mentation/Activity: Drowsy, follows simple commands, oriented to self, place, and time  Respiratory/ Secretions:stable   Hemodynamics:Levophed at moderate dose  Urine output, bowel: Chronic friedman  Diet:NPO  Need for procedures:n/a              ROS:Pertinent items are noted in HPI.    Events, medications, imaging, and notes from last 24 hours reviewed.     Patient Active Problem List   Diagnosis    Urine retention     UTI. Blood cx- Enterobacter , Urine cx- Providencia and morganella from last admission. Received Vanc, Zosyn, and Cefepime.  Acute blood loss anemia- no specific source at this time but possibly lower GI. EGD on 11/2022 showed non-non-bleeding gastric ulcer w/ no signs of recent bleed, duodenitis. Report of black tarry stool with positive FOB per GI.   Acute encephalopathy- toxic-metabolic secondary to sepsis. Presented with AMS, now improved  LORETTA on CKD3- Crea 2.2, was 1.1 (5/12). Pre-renal vs ATN  NGMA- likely from lactic acidosis  BPH with urinary retention- chronic indwelling Malin, changed in the ER  Hx of Left thalamic intracranial hemorrhage with intraventricular hemorrhage s/p  shunt (2014)- baseline right side hemiparesis and facial droop     Patient Active Problem List   Diagnosis    Urine retention    Urinary tract infection without hematuria    Acute cystitis without hematuria    Acute respiratory failure with hypoxemia (HCC)    LORETTA (acute kidney injury) (HCC)    Anemia    Backache    BPH (benign prostatic hyperplasia)    Chronic neck pain    Constipation    Dementia (HCC)    Environmental and seasonal allergies    Essential hypertension    Gastroesophageal reflux disease without esophagitis    Hemiparesis affecting right side as late effect of stroke (HCC)    High anion gap metabolic acidosis    Hydrocephalus, communicating (HCC)    Neutrophilic leukocytosis    Other acute kidney failure (HCC)    Other hyperlipidemia    Prediabetes    Recurrent major depressive disorder, in partial remission (HCC)    Septic shock (HCC)    Sleep difficulties    Vitamin D deficiency        RECOMMENDATIONS:   Neuro: Neuro checks per routine. Avoid sedatives  Pulm: Supplemental O2 via NC, titrate flow for goal SPO2> 90% Bronchodilators, steroids, pulmonary hygiene care, Aspiration precautions, Keep HOB >30 degrees  CVS :Actively titrate vasopressors aim MAP >65mmHg, Check cardiac panel, Echo  Fluids: s/p 2 L in the

## 2024-06-13 NOTE — PLAN OF CARE
Problem: Discharge Planning  Goal: Discharge to home or other facility with appropriate resources  6/13/2024 1637 by Odalis Nelson RN  Outcome: Progressing  6/13/2024 0641 by Rosanne Graham RN  Outcome: Progressing  Flowsheets (Taken 6/12/2024 2345)  Discharge to home or other facility with appropriate resources:   Identify barriers to discharge with patient and caregiver   Arrange for needed discharge resources and transportation as appropriate   Identify discharge learning needs (meds, wound care, etc)     Problem: Safety - Adult  Goal: Free from fall injury  6/13/2024 1637 by Odalis Nelson RN  Outcome: Progressing  6/13/2024 0641 by Rosanne Graham RN  Outcome: Progressing  Flowsheets (Taken 6/13/2024 0000)  Free From Fall Injury: Instruct family/caregiver on patient safety     Problem: Pain  Goal: Verbalizes/displays adequate comfort level or baseline comfort level  6/13/2024 1637 by Odalis Nelson RN  Outcome: Progressing  6/13/2024 0641 by Rosanne Graham RN  Outcome: Progressing     Problem: ABCDS Injury Assessment  Goal: Absence of physical injury  Outcome: Progressing

## 2024-06-13 NOTE — PROGRESS NOTES
attended the interdisciplinary rounds for Alycia Clement, who is a 69 y.o.,male. Patient's Primary Language is: English.   According to the patient's EMR Rastafari Affiliation is: Shinto.     The reason the Patient came to the hospital is:   Patient Active Problem List    Diagnosis Date Noted    LORETTA (acute kidney injury) (HCC) 11/24/2022    High anion gap metabolic acidosis 11/24/2022    Acute respiratory failure with hypoxemia (HCC) 11/23/2022    Neutrophilic leukocytosis 11/23/2022    Other acute kidney failure (HCC) 11/23/2022    Septic shock (HCC) 11/23/2022    Hemiparesis affecting right side as late effect of stroke ([facility]) 04/20/2022    Recurrent major depressive disorder, in partial remission ([facility]) 04/20/2022    BPH (benign prostatic hyperplasia) 01/13/2022    Other hyperlipidemia 01/13/2022    Urinary tract infection without hematuria 01/02/2020    Prediabetes 04/12/2019    Chronic neck pain 04/03/2019    Constipation 04/03/2019    Environmental and seasonal allergies 04/03/2019    Urine retention 03/08/2019    Sleep difficulties 11/26/2018    Acute cystitis without hematuria 09/13/2018    Backache 01/18/2017    Vitamin D deficiency 08/07/2016    Anemia 12/11/2015    Dementia ([facility]) 12/11/2015    Essential hypertension 12/11/2015    Gastroesophageal reflux disease without esophagitis 12/11/2015    Hydrocephalus, communicating ([facility]) 12/11/2015          Plan:   participated and listen to the recommendations of the IDR team.    Patient intubated before rounds this morning. Is a new admission to ICU.     Chaplains will continue to follow and will provide pastoral care on an as needed/requested basis.     recommends bedside caregivers page  on duty if patient shows signs of acute spiritual or emotional distress.     Andriy Warners  Staff   Spiritual Health   [phone]

## 2024-06-13 NOTE — INTERDISCIPLINARY ROUNDS
[facility]  Pulmonary, Critical Care, and Sleep Medicine  Interdisciplinary and Ventilator Weaning Rounds    Patient discussed in morning walking rounds and interdisciplinary rounds.    ICU admission day: 1    Overnight events:   Pt admitted overnight - see H&P    Assessments and best practice:  Ventilator  N/A  Glycemic control  Diet  Diet NPO  Stress ulcer prophylaxis.  Protonix  DVT prophylaxis.  Not indicated 2/2 anemia  Need for Lines, friedman assessed.  Yes  Restraint Reevaluation   N/A  Disposition regarding transferring out of the ICU  RED      Family contact/MPOA:   Family updated     Palliative consult within 3 days of admission to ICU-  Ethics Guidance: 21 days      Daily Plans:  GI consult - potential EGD today  Acutely decompensated requiring intubation  Send coags  Start octreotide  Stress dose steroids    BHUPINDER time 10 minutes        Juany Ignacio PA-C  06/13/24  Pulmonary, Critical Care Medicine  [facility]

## 2024-06-13 NOTE — CARE COORDINATION
06/13/24 1244   /Social Work Whiteboard Notes   /Social Work Whiteboard Red: 6/13- intubated today, higher level life support.-TV     Milad Seals, ALLAN     [phone]

## 2024-06-13 NOTE — PROCEDURES
[facility]  Arterial  Line Procedure Note    Indication: respiratory failure, shock, need for frequent ABGs  Emergently placed.    Line Bundle:   Full sterile barrier precautions used.  7-Step Sterility Protocol followed.  (cap, mask sterile gown, sterile gloves, large sterile sheet, hand hygiene, 2% chlorhexidine for cutaneous antisepsis)  5 mL 1% Lidocaine placed at insertion site.        leftfemoral artery cannulated x 1 attempt(s) utilizing the modified Seldinger technique.    Good blood return.  Catheter secured & Biopatch applied.  Sterile Tegaderm placed.          Umm Scott PA-C  06/13/24  Pulmonary, Critical Care Medicine  [facility]

## 2024-06-13 NOTE — PLAN OF CARE
Problem: Discharge Planning  Goal: Discharge to home or other facility with appropriate resources  Outcome: Progressing  Flowsheets (Taken 6/12/2024 2345)  Discharge to home or other facility with appropriate resources:   Identify barriers to discharge with patient and caregiver   Arrange for needed discharge resources and transportation as appropriate   Identify discharge learning needs (meds, wound care, etc)     Problem: Safety - Adult  Goal: Free from fall injury  Outcome: Progressing  Flowsheets (Taken 6/13/2024 0000)  Free From Fall Injury: Instruct family/caregiver on patient safety     Problem: Pain  Goal: Verbalizes/displays adequate comfort level or baseline comfort level  Outcome: Progressing

## 2024-06-13 NOTE — PROCEDURES
PROCEDURE NOTE  Central Line    Date/Time: 6/12/2024 5:30 PM    Performed by: Gaston Sanchez MD  Authorized by: Gaston Sanchez MD    Consent:     Consent obtained:  Emergent situation    Consent given by:  Patient    Alternatives discussed:  No treatment  Universal protocol:     Patient identity confirmed:  Hospital-assigned identification number  Pre-procedure details:     Indication(s): central venous access      Hand hygiene: Hand hygiene performed prior to insertion      Sterile barrier technique: All elements of maximal sterile technique followed      Skin preparation:  Chlorhexidine  Sedation:     Sedation type:  None  Anesthesia:     Anesthesia method:  Local infiltration    Local anesthetic:  Lidocaine 1% w/o epi  Procedure details:     Location:  R femoral    Site selection rationale:  Neck was contracted poor mobility    Patient position:  Supine    Procedural supplies:  Triple lumen    Landmarks identified: yes      Ultrasound guidance: yes      Ultrasound guidance timing: real time      Sterile ultrasound techniques: Sterile gel and sterile probe covers were used      Number of attempts:  1    Successful placement: yes    Post-procedure details:     Post-procedure:  Dressing applied    Assessment:  Blood return through all ports    Procedure completion:  Tolerated well, no immediate complications

## 2024-06-14 ENCOUNTER — APPOINTMENT (OUTPATIENT)
Facility: HOSPITAL | Age: 69
DRG: 698 | End: 2024-06-14
Payer: MEDICARE

## 2024-06-14 LAB
ABO + RH BLD: NORMAL
ANION GAP SERPL CALC-SCNC: 5 MMOL/L (ref 3–18)
ANION GAP SERPL CALC-SCNC: 8 MMOL/L (ref 3–18)
BACTERIA SPEC CULT: NORMAL
BACTERIA SPEC CULT: NORMAL
BASE DEFICIT BLD-SCNC: 7.8 MMOL/L
BASOPHILS # BLD: 0 K/UL (ref 0–0.1)
BASOPHILS NFR BLD: 0 % (ref 0–2)
BDY SITE: ABNORMAL
BLD PROD TYP BPU: NORMAL
BLOOD BANK BLOOD PRODUCT EXPIRATION DATE: NORMAL
BLOOD BANK DISPENSE STATUS: NORMAL
BLOOD BANK ISBT PRODUCT BLOOD TYPE: 5100
BLOOD BANK PRODUCT CODE: NORMAL
BLOOD BANK UNIT TYPE AND RH: NORMAL
BLOOD GROUP ANTIBODIES SERPL: NORMAL
BPU ID: NORMAL
BUN SERPL-MCNC: 107 MG/DL (ref 7–18)
BUN SERPL-MCNC: 115 MG/DL (ref 7–18)
BUN/CREAT SERPL: 52 (ref 12–20)
BUN/CREAT SERPL: 56 (ref 12–20)
CA-I SERPL-SCNC: 1.16 MMOL/L (ref 1.15–1.33)
CALCIUM SERPL-MCNC: 8.1 MG/DL (ref 8.5–10.1)
CALCIUM SERPL-MCNC: 8.3 MG/DL (ref 8.5–10.1)
CALLED TO: NORMAL
CHLORIDE SERPL-SCNC: 126 MMOL/L (ref 100–111)
CHLORIDE SERPL-SCNC: 130 MMOL/L (ref 100–111)
CO2 SERPL-SCNC: 18 MMOL/L (ref 21–32)
CO2 SERPL-SCNC: 19 MMOL/L (ref 21–32)
CREAT SERPL-MCNC: 2.04 MG/DL (ref 0.6–1.3)
CREAT SERPL-MCNC: 2.06 MG/DL (ref 0.6–1.3)
DIFFERENTIAL METHOD BLD: ABNORMAL
EOSINOPHIL # BLD: 0 K/UL (ref 0–0.4)
EOSINOPHIL NFR BLD: 0 % (ref 0–5)
ERYTHROCYTE [DISTWIDTH] IN BLOOD BY AUTOMATED COUNT: 16.3 % (ref 11.6–14.5)
EST. AVERAGE GLUCOSE BLD GHB EST-MCNC: 108 MG/DL
GAS FLOW.O2 SETTING OXYMISER: 12 BPM
GLUCOSE BLD STRIP.AUTO-MCNC: 156 MG/DL (ref 70–110)
GLUCOSE BLD STRIP.AUTO-MCNC: 165 MG/DL (ref 70–110)
GLUCOSE BLD STRIP.AUTO-MCNC: 177 MG/DL (ref 70–110)
GLUCOSE SERPL-MCNC: 155 MG/DL (ref 74–99)
GLUCOSE SERPL-MCNC: 188 MG/DL (ref 74–99)
HBA1C MFR BLD: 5.4 % (ref 4.2–5.6)
HCO3 BLD-SCNC: 18.2 MMOL/L (ref 22–26)
HCT VFR BLD AUTO: 21.2 % (ref 36–48)
HCT VFR BLD AUTO: 21.5 % (ref 36–48)
HCT VFR BLD AUTO: 22.5 % (ref 36–48)
HCT VFR BLD AUTO: 23.3 % (ref 36–48)
HCT VFR BLD AUTO: 24.4 % (ref 36–48)
HGB BLD-MCNC: 7.1 G/DL (ref 13–16)
HGB BLD-MCNC: 7.4 G/DL (ref 13–16)
HGB BLD-MCNC: 7.7 G/DL (ref 13–16)
HGB BLD-MCNC: 8 G/DL (ref 13–16)
HGB BLD-MCNC: 8.4 G/DL (ref 13–16)
HISTORY CHECK: NORMAL
HISTORY CHECK: NORMAL
IMM GRANULOCYTES # BLD AUTO: 0 K/UL (ref 0–0.04)
IMM GRANULOCYTES NFR BLD AUTO: 0 % (ref 0–0.5)
INR PPP: 1.3 (ref 0.9–1.1)
LACTATE BLD-SCNC: 0.85 MMOL/L (ref 0.4–2)
LYMPHOCYTES # BLD: 0.9 K/UL (ref 0.9–3.6)
LYMPHOCYTES NFR BLD: 3 % (ref 21–52)
MAGNESIUM SERPL-MCNC: 2.1 MG/DL (ref 1.6–2.6)
MCH RBC QN AUTO: 29 PG (ref 24–34)
MCHC RBC AUTO-ENTMCNC: 33.5 G/DL (ref 31–37)
MCV RBC AUTO: 86.5 FL (ref 78–100)
MONOCYTES # BLD: 1.2 K/UL (ref 0.05–1.2)
MONOCYTES NFR BLD: 4 % (ref 3–10)
NEUTS BAND NFR BLD MANUAL: 1 %
NEUTS SEG # BLD: 28.5 K/UL (ref 1.8–8)
NEUTS SEG NFR BLD: 92 % (ref 40–73)
NRBC # BLD: 0.11 K/UL (ref 0–0.01)
NRBC BLD-RTO: 0.4 PER 100 WBC
O2/TOTAL GAS SETTING VFR VENT: 40 %
PCO2 BLD: 38.3 MMHG (ref 35–45)
PERIPHERAL SMEAR, MD REVIEW: NORMAL
PH BLD: 7.28 (ref 7.35–7.45)
PHOSPHATE SERPL-MCNC: 4.5 MG/DL (ref 2.5–4.9)
PLATELET # BLD AUTO: 110 K/UL (ref 135–420)
PLATELET COMMENT: ABNORMAL
PMV BLD AUTO: 10.9 FL (ref 9.2–11.8)
PO2 BLD: 184 MMHG (ref 80–100)
POTASSIUM SERPL-SCNC: 3.3 MMOL/L (ref 3.5–5.5)
POTASSIUM SERPL-SCNC: 4.3 MMOL/L (ref 3.5–5.5)
PROTHROMBIN TIME: 16.7 SEC (ref 11.9–14.9)
RBC # BLD AUTO: 2.45 M/UL (ref 4.35–5.65)
RBC MORPH BLD: ABNORMAL
SAO2 % BLD: 99.5 % (ref 92–97)
SERVICE CMNT-IMP: ABNORMAL
SERVICE CMNT-IMP: NORMAL
SODIUM SERPL-SCNC: 152 MMOL/L (ref 136–145)
SODIUM SERPL-SCNC: 154 MMOL/L (ref 136–145)
SPECIMEN EXP DATE BLD: NORMAL
SPECIMEN TYPE: ABNORMAL
UNIT DIVISION: 0
UNIT ISSUE DATE/TIME: NORMAL
VANCOMYCIN SERPL-MCNC: 25.8 UG/ML (ref 5–40)
VENTILATION MODE VENT: ABNORMAL
VT SETTING VENT: 500 ML
WBC # BLD AUTO: 30.6 K/UL (ref 4.6–13.2)

## 2024-06-14 PROCEDURE — 2000000000 HC ICU R&B

## 2024-06-14 PROCEDURE — 6360000002 HC RX W HCPCS: Performed by: PHYSICIAN ASSISTANT

## 2024-06-14 PROCEDURE — APPSS30 APP SPLIT SHARED TIME 16-30 MINUTES: Performed by: REGISTERED NURSE

## 2024-06-14 PROCEDURE — 85025 COMPLETE CBC W/AUTO DIFF WBC: CPT

## 2024-06-14 PROCEDURE — 85014 HEMATOCRIT: CPT

## 2024-06-14 PROCEDURE — 99152 MOD SED SAME PHYS/QHP 5/>YRS: CPT

## 2024-06-14 PROCEDURE — 82962 GLUCOSE BLOOD TEST: CPT

## 2024-06-14 PROCEDURE — 6370000000 HC RX 637 (ALT 250 FOR IP): Performed by: PHYSICIAN ASSISTANT

## 2024-06-14 PROCEDURE — C9113 INJ PANTOPRAZOLE SODIUM, VIA: HCPCS | Performed by: PHYSICIAN ASSISTANT

## 2024-06-14 PROCEDURE — 71045 X-RAY EXAM CHEST 1 VIEW: CPT

## 2024-06-14 PROCEDURE — 74018 RADEX ABDOMEN 1 VIEW: CPT

## 2024-06-14 PROCEDURE — 3600007512: Performed by: INTERNAL MEDICINE

## 2024-06-14 PROCEDURE — 2580000003 HC RX 258: Performed by: INTERNAL MEDICINE

## 2024-06-14 PROCEDURE — 82803 BLOOD GASES ANY COMBINATION: CPT

## 2024-06-14 PROCEDURE — 37799 UNLISTED PX VASCULAR SURGERY: CPT

## 2024-06-14 PROCEDURE — P9073 PLATELETS PHERESIS PATH REDU: HCPCS

## 2024-06-14 PROCEDURE — 83605 ASSAY OF LACTIC ACID: CPT

## 2024-06-14 PROCEDURE — 85018 HEMOGLOBIN: CPT

## 2024-06-14 PROCEDURE — 86901 BLOOD TYPING SEROLOGIC RH(D): CPT

## 2024-06-14 PROCEDURE — 3600007502: Performed by: INTERNAL MEDICINE

## 2024-06-14 PROCEDURE — 6360000002 HC RX W HCPCS

## 2024-06-14 PROCEDURE — 99223 1ST HOSP IP/OBS HIGH 75: CPT | Performed by: SURGERY

## 2024-06-14 PROCEDURE — 2580000003 HC RX 258: Performed by: PHYSICIAN ASSISTANT

## 2024-06-14 PROCEDURE — 30233N1 TRANSFUSION OF NONAUTOLOGOUS RED BLOOD CELLS INTO PERIPHERAL VEIN, PERCUTANEOUS APPROACH: ICD-10-PCS | Performed by: STUDENT IN AN ORGANIZED HEALTH CARE EDUCATION/TRAINING PROGRAM

## 2024-06-14 PROCEDURE — P9016 RBC LEUKOCYTES REDUCED: HCPCS

## 2024-06-14 PROCEDURE — 83036 HEMOGLOBIN GLYCOSYLATED A1C: CPT

## 2024-06-14 PROCEDURE — 2500000003 HC RX 250 WO HCPCS: Performed by: REGISTERED NURSE

## 2024-06-14 PROCEDURE — 6360000002 HC RX W HCPCS: Performed by: STUDENT IN AN ORGANIZED HEALTH CARE EDUCATION/TRAINING PROGRAM

## 2024-06-14 PROCEDURE — 83735 ASSAY OF MAGNESIUM: CPT

## 2024-06-14 PROCEDURE — 51702 INSERT TEMP BLADDER CATH: CPT

## 2024-06-14 PROCEDURE — 80202 ASSAY OF VANCOMYCIN: CPT

## 2024-06-14 PROCEDURE — 86850 RBC ANTIBODY SCREEN: CPT

## 2024-06-14 PROCEDURE — 36430 TRANSFUSION BLD/BLD COMPNT: CPT

## 2024-06-14 PROCEDURE — 94003 VENT MGMT INPAT SUBQ DAY: CPT

## 2024-06-14 PROCEDURE — 86900 BLOOD TYPING SEROLOGIC ABO: CPT

## 2024-06-14 PROCEDURE — 30233R1 TRANSFUSION OF NONAUTOLOGOUS PLATELETS INTO PERIPHERAL VEIN, PERCUTANEOUS APPROACH: ICD-10-PCS | Performed by: STUDENT IN AN ORGANIZED HEALTH CARE EDUCATION/TRAINING PROGRAM

## 2024-06-14 PROCEDURE — 0DJ08ZZ INSPECTION OF UPPER INTESTINAL TRACT, VIA NATURAL OR ARTIFICIAL OPENING ENDOSCOPIC: ICD-10-PCS | Performed by: INTERNAL MEDICINE

## 2024-06-14 PROCEDURE — 6360000002 HC RX W HCPCS: Performed by: INTERNAL MEDICINE

## 2024-06-14 PROCEDURE — 82330 ASSAY OF CALCIUM: CPT

## 2024-06-14 PROCEDURE — 84100 ASSAY OF PHOSPHORUS: CPT

## 2024-06-14 PROCEDURE — 2709999900 HC NON-CHARGEABLE SUPPLY: Performed by: INTERNAL MEDICINE

## 2024-06-14 PROCEDURE — 85610 PROTHROMBIN TIME: CPT

## 2024-06-14 PROCEDURE — 99291 CRITICAL CARE FIRST HOUR: CPT | Performed by: INTERNAL MEDICINE

## 2024-06-14 PROCEDURE — 80048 BASIC METABOLIC PNL TOTAL CA: CPT

## 2024-06-14 RX ORDER — PROPOFOL 10 MG/ML
25-50 INJECTION, EMULSION INTRAVENOUS CONTINUOUS
Status: CANCELLED | OUTPATIENT
Start: 2024-06-14

## 2024-06-14 RX ORDER — CHLORHEXIDINE GLUCONATE ORAL RINSE 1.2 MG/ML
15 SOLUTION DENTAL 2 TIMES DAILY
Status: DISCONTINUED | OUTPATIENT
Start: 2024-06-14 | End: 2024-06-17

## 2024-06-14 RX ORDER — PROPOFOL 10 MG/ML
25-50 INJECTION, EMULSION INTRAVENOUS CONTINUOUS
Status: DISCONTINUED | OUTPATIENT
Start: 2024-06-14 | End: 2024-06-15

## 2024-06-14 RX ORDER — DEXTROSE MONOHYDRATE 100 MG/ML
INJECTION, SOLUTION INTRAVENOUS CONTINUOUS PRN
Status: DISCONTINUED | OUTPATIENT
Start: 2024-06-14 | End: 2024-06-21 | Stop reason: HOSPADM

## 2024-06-14 RX ORDER — PROPOFOL 10 MG/ML
INJECTION, EMULSION INTRAVENOUS
Status: COMPLETED
Start: 2024-06-14 | End: 2024-06-14

## 2024-06-14 RX ORDER — DEXTROSE MONOHYDRATE 50 MG/ML
INJECTION, SOLUTION INTRAVENOUS CONTINUOUS
Status: DISCONTINUED | OUTPATIENT
Start: 2024-06-14 | End: 2024-06-17

## 2024-06-14 RX ORDER — INSULIN LISPRO 100 [IU]/ML
0-4 INJECTION, SOLUTION INTRAVENOUS; SUBCUTANEOUS EVERY 6 HOURS
Status: DISCONTINUED | OUTPATIENT
Start: 2024-06-14 | End: 2024-06-21 | Stop reason: HOSPADM

## 2024-06-14 RX ORDER — ATROPINE SULFATE 0.1 MG/ML
1 INJECTION INTRAVENOUS ONCE
Status: COMPLETED | OUTPATIENT
Start: 2024-06-14 | End: 2024-06-14

## 2024-06-14 RX ORDER — SODIUM CHLORIDE 9 MG/ML
INJECTION, SOLUTION INTRAVENOUS PRN
Status: DISCONTINUED | OUTPATIENT
Start: 2024-06-14 | End: 2024-06-15

## 2024-06-14 RX ORDER — GLUCAGON 1 MG
1 KIT INJECTION PRN
Status: DISCONTINUED | OUTPATIENT
Start: 2024-06-14 | End: 2024-06-21 | Stop reason: HOSPADM

## 2024-06-14 RX ORDER — ATROPINE SULFATE 0.1 MG/ML
INJECTION INTRAVENOUS
Status: COMPLETED
Start: 2024-06-14 | End: 2024-06-14

## 2024-06-14 RX ORDER — DOPAMINE HYDROCHLORIDE 160 MG/100ML
5 INJECTION, SOLUTION INTRAVENOUS CONTINUOUS
Status: DISCONTINUED | OUTPATIENT
Start: 2024-06-14 | End: 2024-06-17

## 2024-06-14 RX ORDER — SODIUM CHLORIDE 9 MG/ML
INJECTION, SOLUTION INTRAVENOUS PRN
Status: DISCONTINUED | OUTPATIENT
Start: 2024-06-14 | End: 2024-06-16

## 2024-06-14 RX ADMIN — CHLORHEXIDINE GLUCONATE 0.12% ORAL RINSE 15 ML: 1.2 LIQUID ORAL at 14:45

## 2024-06-14 RX ADMIN — VASOPRESSIN 0.03 UNITS/MIN: 20 INJECTION INTRAVENOUS at 22:38

## 2024-06-14 RX ADMIN — PANTOPRAZOLE SODIUM 40 MG: 40 INJECTION, POWDER, LYOPHILIZED, FOR SOLUTION INTRAVENOUS at 17:32

## 2024-06-14 RX ADMIN — HYDROCORTISONE SODIUM SUCCINATE 100 MG: 100 INJECTION, POWDER, FOR SOLUTION INTRAMUSCULAR; INTRAVENOUS at 03:02

## 2024-06-14 RX ADMIN — CHLORHEXIDINE GLUCONATE 0.12% ORAL RINSE 15 ML: 1.2 LIQUID ORAL at 20:49

## 2024-06-14 RX ADMIN — ATROPINE SULFATE INJECTION 1 MG: 0.1 INJECTION, SOLUTION INTRAVENOUS at 14:40

## 2024-06-14 RX ADMIN — CALCIUM GLUCONATE 1000 MG: 20 INJECTION, SOLUTION INTRAVENOUS at 00:01

## 2024-06-14 RX ADMIN — VASOPRESSIN 0.03 UNITS/MIN: 20 INJECTION INTRAVENOUS at 00:31

## 2024-06-14 RX ADMIN — VASOPRESSIN 0.03 UNITS/MIN: 20 INJECTION INTRAVENOUS at 13:26

## 2024-06-14 RX ADMIN — SODIUM CHLORIDE, PRESERVATIVE FREE 10 ML: 5 INJECTION INTRAVENOUS at 20:50

## 2024-06-14 RX ADMIN — PIPERACILLIN AND TAZOBACTAM 3375 MG: 3; .375 INJECTION, POWDER, FOR SOLUTION INTRAVENOUS at 01:25

## 2024-06-14 RX ADMIN — DOPAMINE HYDROCHLORIDE 5 MCG/KG/MIN: 160 INJECTION, SOLUTION INTRAVENOUS at 15:05

## 2024-06-14 RX ADMIN — CALCIUM GLUCONATE 1000 MG: 20 INJECTION, SOLUTION INTRAVENOUS at 01:24

## 2024-06-14 RX ADMIN — PANTOPRAZOLE SODIUM 40 MG: 40 INJECTION, POWDER, LYOPHILIZED, FOR SOLUTION INTRAVENOUS at 04:03

## 2024-06-14 RX ADMIN — PROPOFOL 1000 MG: 10 INJECTION, EMULSION INTRAVENOUS at 13:40

## 2024-06-14 RX ADMIN — DEXTROSE MONOHYDRATE: 50 INJECTION, SOLUTION INTRAVENOUS at 11:31

## 2024-06-14 RX ADMIN — ATROPINE SULFATE 1 MG: 0.1 INJECTION INTRAVENOUS at 14:40

## 2024-06-14 RX ADMIN — FENTANYL CITRATE 50 MCG/HR: at 13:34

## 2024-06-14 RX ADMIN — PIPERACILLIN AND TAZOBACTAM 3375 MG: 3; .375 INJECTION, POWDER, FOR SOLUTION INTRAVENOUS at 09:21

## 2024-06-14 RX ADMIN — PIPERACILLIN AND TAZOBACTAM 3375 MG: 3; .375 INJECTION, POWDER, FOR SOLUTION INTRAVENOUS at 16:30

## 2024-06-14 ASSESSMENT — PULMONARY FUNCTION TESTS
PIF_VALUE: 28
PIF_VALUE: 28
PIF_VALUE: 21
PIF_VALUE: 27
PIF_VALUE: 25
PIF_VALUE: 22

## 2024-06-14 ASSESSMENT — PAIN SCALES - GENERAL
PAINLEVEL_OUTOF10: 0

## 2024-06-14 NOTE — PROGRESS NOTES
VENTILATOR CARE PLAN    Problem: Ventilator Management  Goal: *Adequate oxygenation/ ventilation/ and extubation      Patient:        Alycia Clement     69 y.o.   male     6/14/2024  5:31 PM  Patient Active Problem List   Diagnosis    Urine retention    Urinary tract infection without hematuria    Acute cystitis without hematuria    Acute respiratory failure with hypoxemia (HCC)    LORETTA (acute kidney injury) (HCC)    Anemia    Backache    BPH (benign prostatic hyperplasia)    Chronic neck pain    Constipation    Dementia (HCC)    Environmental and seasonal allergies    Essential hypertension    Gastroesophageal reflux disease without esophagitis    Hemiparesis affecting right side as late effect of stroke (HCC)    High anion gap metabolic acidosis    Hydrocephalus, communicating (HCC)    Neutrophilic leukocytosis    Other acute kidney failure (HCC)    Other hyperlipidemia    Prediabetes    Recurrent major depressive disorder, in partial remission (HCC)    Septic shock (HCC)    Sleep difficulties    Vitamin D deficiency    Hemorrhagic shock (HCC)    Acute respiratory failure with hypoxia (HCC)    Goals of care, counseling/discussion    Encounter for palliative care    Acute encephalopathy    Acute blood loss anemia    History of CVA with residual deficit    History of creation of ventriculoperitoneal shunt    Debility    Septic shock due to urinary tract infection (HCC)       Septic shock (HCC) [A41.9, R65.21]    Reason patient intubated: Airway protection secondary to septic shock with AMS.    Ventilator day: 2    Ventilator settings: PRVC RR 14, vV 500, PEEP 5, FiO2 28%    ETT Size/Placement:  7.5, 23 at upper left lip     Wean Screen Pass (Yes or No): No  Wean Screen Reason for Failure: Sedated  Duration of Weaning Trial: -   Additional Comments: -         PLAN OF CARE: Maintain adequate ventilation and oxygenation, wean vent settings as tolerated, SBT as tolerated when appropriate.       GOAL:  Extubation.      OUTCOME:  Not progressing.     ABG:  Date:6/14/2024   Latest Reference Range & Units 06/14/24 04:25   Set Rate bpm 12   Site -   DRAWN FROM ARTERIAL LINE   Mode -   ASSIST CONTROL   POC pH 7.35 - 7.45   7.28 (L)   POC pCO2 35.0 - 45.0 MMHG 38.3   POC PO2 80 - 100 MMHG 184 (H)   POC HCO3 22 - 26 MMOL/L 18.2 (L)   POC O2 SAT 92 - 97 % 99.5 (H)   POC TIDAL VOLUME ml 500   FIO2 % 40   (L): Data is abnormally low  (H): Data is abnormally high      Chest X-ray:  Date:6/14/2024  XR CHEST PORTABLE  Order: [id number]  Status: Final result       Visible to patient: No (not released)       Next appt: None    0 Result Notes  Details    Reading Physician Reading Date Result Priority   Norma Matias MD  [phone] 6/14/2024      Narrative & Impression  EXAM: XR CHEST PORTABLE     CLINICAL INDICATION/HISTORY : Provided with order: while intubated.     COMPARISON: June 13, 2024     TECHNIQUE: 1 VIEWS     FINDINGS:  Endotracheal tube has been repositioned and the tip is now approximately 5.6 cm  above the gris. Gastric tube tip overlies the LEFT upper abdomen with the  sidehole near the GE junction. Shunt tubing overlies the RIGHT chest wall.  Multiple other overlying structures. EKG leads and wires overlie the chest.  Hypoinflation retrocardiac opacity is likely a combination of atelectasis and  overlying structures...  Heart is normal in size.  No large pleural effusion.        IMPRESSION:  1.  Support devices as above.  2.  Mild retrocardiac atelectasis              Electronically signed by Norma Matias           Specimen Collected: 06/14/24 08:17 EDT Last Resulted: 06/14/24 08:18 EDT       Order Details        View Encounter        Lab and Collection Details        Routing        Result History     View All Conversations on this Encounter           Result Care Coordination      Patient Communication     Not Released  Not seen Back to Top           Order Providers    Authorizing Billing   Martin

## 2024-06-14 NOTE — PROGRESS NOTES
Patient is on ventilation support and unable to hold conversation. Patient is not available to be assessed at this time. No family members present by the bedside during the 2x attempts to visit today.       Sara Manzanares, [facility]  Spiritual Care   [phone]

## 2024-06-14 NOTE — PROGRESS NOTES
[facility]   Pharmacy Pharmacokinetic Monitoring Service - Vancomycin    Indication: sepsis  Goal level: 10-20 mg/L  Day of Therapy: 3  Additional Antimicrobials: pip-tazo    Pertinent Laboratory Values:   Wt Readings from Last 1 Encounters:   06/13/24 77.6 kg (171 lb)     Temp Readings from Last 1 Encounters:   06/14/24 99 °F (37.2 °C) (Axillary)     Estimated Creatinine Clearance: 35 mL/min (A) (based on SCr of 2.06 mg/dL (H)).    Recent Labs     06/13/24  0349 06/13/24  1039 06/13/24  1235 06/14/24  0301   CREATININE 1.90*   < > 1.76* 2.06*   *  --   --  107*   WBC 18.3*  --   --  30.6*    < > = values in this interval not displayed.     Pertinent Cultures:  Date Source Results   6/12 blood NGTD   6/12 urine pending   MRSA Nasal Swab: pending    Assessment:  Date Current Dose Level (mg/L) Timing of Level (h)   6/12 1,750 mg x1 - -   6/13 1,250 mg x1 17.3 13   6/14 - 25.8 18     Plan:  Dose by level  No dose today  Ordered a level for 6/15 with AM labs  Pharmacy will continue to monitor patient and adjust therapy as indicated    Thank you for the consult,  Tono Smiley RPH  6/14/2024

## 2024-06-14 NOTE — PROGRESS NOTES
Comprehensive Nutrition Assessment    Type and Reason for Visit:  Initial, NPO/Clear Liquid    Nutrition Recommendations/Plan:   If/when okay with MD, initiate tube feeding regimen:   Formula: Vital AF 1.2 (Peptide Based)  Start at 20 ml/hr  Advance as tolerated by 10 ml q 6 hours  Goal Rate: 75 ml/hr x 20 hours (for anticipation of holding tube feeds for standard nursing care)  Water Flushes: 50 mL q 1 hours (1200 mL total) - or per MD  Goal Regimen Provides (with modulars):  1800 kcal, 113g protein, 2415 ml free water, 100% RDIs  D5 per MD.  Continue to monitor tolerance of EN, weight, labs, and plan of care during admission.      Malnutrition Assessment:  Malnutrition Status:  Insufficient data (vs at risk, NPO, vent, potential significant wt loss PTA) (06/14/24 1136)    Context:  Acute Illness       Nutrition History and Allergies:      Past Medical History:   Diagnosis Date    LORETTA (acute kidney injury) ([facility])     Back pain     Bimalleolar fracture of right ankle 07/25/2018    CKD (chronic kidney disease)     Closed fracture of right ankle 11/26/2018    Essential hypertension 12/11/2015    Gastroesophageal reflux disease without esophagitis 12/11/2015    History of hemorrhagic stroke with residual hemiparesis ([facility]) 09/21/2014    Non-adherence to medical treatment     Non-adherence to medical treatment     Potassium (K) deficiency 11/26/2018    Sepsis ([facility])     Urinary retention     Urinary tract infection without hematuria     Vision abnormalities      (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 Will continue to monitor wt trends in-house.   Wt Readings from Last 10 Encounters:   06/13/24 77.6 kg (171 lb)   01/09/24 86.2 kg (190 lb)   07/07/23 86.2 kg (190 lb)   06/09/23 72.6 kg (160 lb)   05/12/23 72.6 kg (160 lb)   03/24/23 72.6 kg (160 lb)   02/24/23 72.6 kg (160 lb)     NFPE: Limited NFPE conducted. Food Allergies: FA    Nutrition

## 2024-06-14 NOTE — CONSULTS
Interventional Radiology    Case and images reviewed with Dr. Weiner    70yo male with large, high risk duodenal bulb ulcer with recent hemorrhage as seen on EGD today.    IR will remain available as needed for GDA embolization. Prefer not to embolize empirically as this will increase risk of duodenal ulcer perforation with subsequent peritonitis.    No intervention planned at this time unless the patient begins to actively bleed. If bleeding, no need for a CTA as the area has already been localized. Call team is aware.    We will continue to follow    Thank you,  NEELAM Radford  [phone]

## 2024-06-14 NOTE — PROCEDURES
PROCEDURE NOTE  Date: 6/14/2024   Name: Alycia Clement  YOB: 1955    Procedures            [email or web address]  [phone]     Endoscopic Gastroduodenoscopy Procedure Note    Alycia Clement  1955  [id number]    Indication: 1. Melena  2. Anemia refractory to transfusion     : Jade Witt MD    Surgical Assistants: none    Referring Provider:  None, None    Anesthesia/Sedation:  Fentanyl 150 mcg IV      Procedure Details     After infomed consent was obtained for the procedure, with all risks and benefits of procedure explained the patient was taken to the endoscopy suite and placed in the left lateral decubitus position.  Following sequential administration of sedation as per above, the endoscope was inserted into the mouth and advanced under direct vision to second portion of the duodenum.  A careful inspection was made as the gastroscope was withdrawn, including a retroflexed view of the proximal stomach; findings and interventions are described below.      Findings:   Esophagus: tip of NGT noted in mid to upper esophagus, small circular non-bleeding erosion noted distal to NGT, likely due to suction; no active hemorrhage noted in esophagus  Stomach: large volume of dark blood and clot burden, cleared mid and distal stomach with lavage and suction, some residual blood and clot within the gastric fundus that could not be cleared due to positioning, no obvious source for hemorrhage within the stomach  Duodenum/jejunum:  large, ~2 cm very deep proximal duodenal bulb ulcer with overlying clot and likely visible vessel, NO active hemorrhage visualized - given depth and size of ulcer no intervention attempted as the risk of perforation is exceedingly high; several smaller ~0.5 cm clean-based erosions/ulcers noted in the distal duodenal bulb with no signs of recent hemorrhage ; second portion of duodenum cleared with lavage, no signs of active hemorrhage distal to duodenal

## 2024-06-14 NOTE — PALLIATIVE CARE
Palliative Medicine     With the permission of Ms. Jade Vasquez call was placed to patient's sister, Margarita Clement. The purpose is to gather information on patient's other daughter who according to the family is estranged. Ms Clement staed the daughter is named Dora (not sure of spelling ) Malu. She resides in [place] and Ms Avila has no phone contact information. This information will be provided to the care manager for family search.   At this time, Legal next of kin are Jade and Hung Vasquez until additional information on daughter is obtained.     Marry EVERETTN, RN, PN  Palliative Medicine Inpatient RN  Palliative [facility] Line: [phone].

## 2024-06-14 NOTE — INTERDISCIPLINARY ROUNDS
[facility]  Pulmonary, Critical Care, and Sleep Medicine  Interdisciplinary and Ventilator Weaning Rounds    Patient discussed in morning walking rounds and interdisciplinary rounds.    ICU admission day: 6/12    Overnight events:   No acute overnight events    Assessments and best practice:  Ventilator  N/A  Glycemic control  Diet  Diet NPO  Stress ulcer prophylaxis.  Protonix  DVT prophylaxis.  Not indicated 2/2 anemia  Need for Lines, friedman assessed.  Yes  Restraint Reevaluation   N/A  Disposition regarding transferring out of the ICU  RED      Family contact/MPOA: Jade Vasquez, daughter  Family updated  yesterday at bedside    Palliative consult within 3 days of admission to ICU- yes  Ethics Guidance: 21 days      Daily Plans:  GI consult - potential EGD today  2 units of PRBCs and recheck H&H  Q4 H&H  D/C stress dose steroids  Will disimpact today  D5 gtt added for hypernatremia    BHUPINDER time 10 minutes        Umm Scott PA-C  06/14/24  Pulmonary, Critical Care Medicine  [facility]

## 2024-06-14 NOTE — PROGRESS NOTES
[facility].  Pulmonary, Critical Care, and Sleep Medicine    Name: Alycia Clement MRN: [id number]   : 1955 Hospital: [facility]   Date: 2024  Admission Date: 2024     Chart and notes reviewed. Data reviewed. I have evaluated all findings.    [x]I have reviewed the flowsheet and previous day’s notes.    []The patient is unable to give any meaningful history or review of systems because the patient is:  []Intubated []Sedated   []Unresponsive      []The patient is critically ill on      []Mechanical ventilation []Pressors   []BiPAP []         Interval HPI:  Patient is a 69 y.o. male with PMHx of previous CVA with residual right hemiparesis and aphasia, BPH with indwelling Friedman catheter, history of  shunt, CKD, GERD, multiple admission for UTI, brought in by EMS from nursing facility due to altered mental status, unknown last known well. Pt tachycardic, hypotensive, and short of breath on presentation. Work-up significant for leukocytosis, WBC 19k, lactic acidosis, anemia with Hgb of 5, LORETTA, hyperglycemia. UA positive for LE, pyuria, bacteruria. CXR negative. CTH negative for acute process. Pt refractory to 2 L IVF so started on Levophed and right fe CVL inserted. Pt also receiving 2 units PRBC. GI was consulted with plans to perform EGD in the morning pending clinical course. Pt will be admitted to our service.   : Pt rapidly decompensated hemodynamically and was found to have bright red blood in his mouth. Pt was emergently intubated. Additional blood products ordered and started on Octreotide.   Subjective   Hospital Day:1   Vent Day:  Overnight events:Pt had 2 episodes of large, liquid melanotic stool. 2 units PRBC and 1 unit platelet ordered  Neuro:sedated on Fentanyl   Respiratory/ Secretions:emergently intubated yesterday, FIO2:30%, PEEP:8  Hemodynamics:Levophed and Vasopressin  Urine output, bowel: Chronic friedman, 880 urine output, renal indexes  trending up  Diet:NPO  Need for procedures: EGD              ROS:Pertinent items are noted in HPI.    Events, medications, imaging, and notes from last 24 hours reviewed.     Patient Active Problem List   Diagnosis    Urine retention    Urinary tract infection without hematuria    Acute cystitis without hematuria    Acute respiratory failure with hypoxemia (HCC)    LORETTA (acute kidney injury) (HCC)    Anemia    Backache    BPH (benign prostatic hyperplasia)    Chronic neck pain    Constipation    Dementia (HCC)    Environmental and seasonal allergies    Essential hypertension    Gastroesophageal reflux disease without esophagitis    Hemiparesis affecting right side as late effect of stroke (HCC)    High anion gap metabolic acidosis    Hydrocephalus, communicating (HCC)    Neutrophilic leukocytosis    Other acute kidney failure (HCC)    Other hyperlipidemia    Prediabetes    Recurrent major depressive disorder, in partial remission (HCC)    Septic shock (HCC)    Sleep difficulties    Vitamin D deficiency       Vital Signs:  BP (!) 106/52   Pulse 76   Temp 97.4 °F (36.3 °C)   Resp 23   Ht 1.778 m (5' 10\")   Wt 78 kg (171 lb 14.4 oz)   SpO2 96%   BMI 24.67 kg/m²             Temp (24hrs), Av.2 °F (36.8 °C), Min:97.4 °F (36.3 °C), Max:99 °F (37.2 °C)       Intake/Output:   Last shift:       1901 -  0700  In: 434.1   Out: -   Last 3 shifts: No intake/output data recorded.    Intake/Output Summary (Last 24 hours) at 2024 0137  Last data filed at 2024 205  Gross per 24 hour   Intake 434.08 ml   Output --   Net 434.08 ml            Telemetry: []Sinus []A-flutter []Paced    []A-fib []Multiple PVC’s                  Physical Exam:      General: Sedated ill appearing  HEENT:  Anicteric sclerae; pink palpebral conjunctivae; mucosa moist  Resp:  Symmetrical chest expansion, no accessory muscle use; good airway entry; no rales/ wheezing/ rhonchi noted  CV:  S1, S2 present; regular rate and rhythm  GI:

## 2024-06-14 NOTE — PLAN OF CARE
Physical Injury: Implement safety measures based on patient assessment     Problem: Skin/Tissue Integrity  Goal: Absence of new skin breakdown  Description: 1.  Monitor for areas of redness and/or skin breakdown  2.  Assess vascular access sites hourly  3.  Every 4-6 hours minimum:  Change oxygen saturation probe site  4.  Every 4-6 hours:  If on nasal continuous positive airway pressure, respiratory therapy assess nares and determine need for appliance change or resting period.  6/14/2024 1120 by Popeye Suárez, RN  Outcome: /[facility] Progressing  6/13/2024 2213 by Rosanne Graham RN  Outcome: Progressing     Problem: Safety - Medical Restraint  Goal: Remains free of injury from restraints (Restraint for Interference with Medical Device)  Description: INTERVENTIONS:  1. Determine that other, less restrictive measures have been tried or would not be effective before applying the restraint  2. Evaluate the patient's condition at the time of restraint application  3. Inform patient/family regarding the reason for restraint  4. Q2H: Monitor safety, psychosocial status, comfort, nutrition and hydration  Outcome: /[facility] Progressing  Flowsheets  Taken 6/14/2024 1000  Remains free of injury from restraints (restraint for interference with medical device):   Determine that other, less restrictive measures have been tried or would not be effective before applying the restraint   Evaluate the patient's condition at the time of restraint application   Inform patient/family regarding the reason for restraint   Every 2 hours: Monitor safety, psychosocial status, comfort, nutrition and hydration  Taken 6/14/2024 0850  Remains free of injury from restraints (restraint for interference with medical device):   Determine that other, less restrictive measures have been tried or would not be effective before applying the restraint   Evaluate the patient's condition at the time of restraint application   Inform patient/family regarding  the reason for restraint   Every 2 hours: Monitor safety, psychosocial status, comfort, nutrition and hydration

## 2024-06-14 NOTE — PROGRESS NOTES
No SBT at this time-     06/14/24 0755   Weaning Parameters   Spontaneous Breathing Trial Complete (S)  No (comment)  (Sedated)

## 2024-06-14 NOTE — PLAN OF CARE
Problem: Discharge Planning  Goal: Discharge to home or other facility with appropriate resources  6/13/2024 2213 by Rosanne Graham RN  Outcome: Progressing  6/13/2024 1637 by Odalis Nelson RN  Outcome: Progressing     Problem: Safety - Adult  Goal: Free from fall injury  6/13/2024 2213 by Rosanne Graham RN  Outcome: Progressing  Flowsheets (Taken 6/13/2024 2100)  Free From Fall Injury: Instruct family/caregiver on patient safety  6/13/2024 1637 by Odalis Nelson RN  Outcome: Progressing     Problem: Pain  Goal: Verbalizes/displays adequate comfort level or baseline comfort level  6/13/2024 2213 by Rosanne Graham RN  Outcome: Progressing  6/13/2024 1637 by Odalis Nelson RN  Outcome: Progressing     Problem: ABCDS Injury Assessment  Goal: Absence of physical injury  6/13/2024 2213 by Rosanne Graham RN  Outcome: Progressing  Flowsheets (Taken 6/13/2024 2100)  Absence of Physical Injury: Implement safety measures based on patient assessment  6/13/2024 1637 by Odalis Nelson RN  Outcome: Progressing     Problem: Skin/Tissue Integrity  Goal: Absence of new skin breakdown  Description: 1.  Monitor for areas of redness and/or skin breakdown  2.  Assess vascular access sites hourly  3.  Every 4-6 hours minimum:  Change oxygen saturation probe site  4.  Every 4-6 hours:  If on nasal continuous positive airway pressure, respiratory therapy assess nares and determine need for appliance change or resting period.  Outcome: Progressing

## 2024-06-15 ENCOUNTER — APPOINTMENT (OUTPATIENT)
Facility: HOSPITAL | Age: 69
DRG: 698 | End: 2024-06-15
Payer: MEDICARE

## 2024-06-15 LAB
ABO + RH BLD: NORMAL
ANION GAP SERPL CALC-SCNC: 8 MMOL/L (ref 3–18)
ANION GAP SERPL CALC-SCNC: 9 MMOL/L (ref 3–18)
BASE DEFICIT BLD-SCNC: 4.8 MMOL/L
BASOPHILS # BLD: 0 K/UL (ref 0–0.1)
BASOPHILS NFR BLD: 0 % (ref 0–2)
BDY SITE: ABNORMAL
BLD PROD TYP BPU: NORMAL
BLOOD BANK BLOOD PRODUCT EXPIRATION DATE: NORMAL
BLOOD BANK CMNT PATIENT-IMP: NORMAL
BLOOD BANK DISPENSE STATUS: NORMAL
BLOOD BANK ISBT PRODUCT BLOOD TYPE: 5100
BLOOD BANK PRODUCT CODE: NORMAL
BLOOD BANK UNIT TYPE AND RH: NORMAL
BLOOD GROUP ANTIBODIES SERPL: NORMAL
BPU ID: NORMAL
BUN SERPL-MCNC: 105 MG/DL (ref 7–18)
BUN SERPL-MCNC: 82 MG/DL (ref 7–18)
BUN/CREAT SERPL: 43 (ref 12–20)
BUN/CREAT SERPL: 53 (ref 12–20)
CALCIUM SERPL-MCNC: 8.5 MG/DL (ref 8.5–10.1)
CALCIUM SERPL-MCNC: 8.6 MG/DL (ref 8.5–10.1)
CALLED TO: NORMAL
CALLED TO: NORMAL
CALLED TO:,BCALL3: NORMAL
CALLED TO:: NORMAL
CHLORIDE SERPL-SCNC: 128 MMOL/L (ref 100–111)
CHLORIDE SERPL-SCNC: 130 MMOL/L (ref 100–111)
CO2 SERPL-SCNC: 18 MMOL/L (ref 21–32)
CO2 SERPL-SCNC: 18 MMOL/L (ref 21–32)
CREAT SERPL-MCNC: 1.92 MG/DL (ref 0.6–1.3)
CREAT SERPL-MCNC: 1.99 MG/DL (ref 0.6–1.3)
CROSSMATCH RESULT: NORMAL
DIFFERENTIAL METHOD BLD: ABNORMAL
EOSINOPHIL # BLD: 0 K/UL (ref 0–0.4)
EOSINOPHIL NFR BLD: 0 % (ref 0–5)
ERYTHROCYTE [DISTWIDTH] IN BLOOD BY AUTOMATED COUNT: 15.9 % (ref 11.6–14.5)
GLUCOSE BLD STRIP.AUTO-MCNC: 135 MG/DL (ref 70–110)
GLUCOSE BLD STRIP.AUTO-MCNC: 164 MG/DL (ref 70–110)
GLUCOSE BLD STRIP.AUTO-MCNC: 167 MG/DL (ref 70–110)
GLUCOSE BLD STRIP.AUTO-MCNC: 168 MG/DL (ref 70–110)
GLUCOSE SERPL-MCNC: 142 MG/DL (ref 74–99)
GLUCOSE SERPL-MCNC: 145 MG/DL (ref 74–99)
HCO3 BLD-SCNC: 17.6 MMOL/L (ref 22–26)
HCT VFR BLD AUTO: 20.5 % (ref 36–48)
HCT VFR BLD AUTO: 21.2 % (ref 36–48)
HCT VFR BLD AUTO: 22 % (ref 36–48)
HCT VFR BLD AUTO: 22.4 % (ref 36–48)
HCT VFR BLD AUTO: 22.7 % (ref 36–48)
HCT VFR BLD AUTO: 23.2 % (ref 36–48)
HGB BLD-MCNC: 7.2 G/DL (ref 13–16)
HGB BLD-MCNC: 7.2 G/DL (ref 13–16)
HGB BLD-MCNC: 7.7 G/DL (ref 13–16)
HGB BLD-MCNC: 7.7 G/DL (ref 13–16)
HGB BLD-MCNC: 7.8 G/DL (ref 13–16)
HGB BLD-MCNC: 7.9 G/DL (ref 13–16)
IMM GRANULOCYTES # BLD AUTO: 0.1 K/UL (ref 0–0.04)
IMM GRANULOCYTES NFR BLD AUTO: 1 % (ref 0–0.5)
LACTATE BLD-SCNC: 0.98 MMOL/L (ref 0.4–2)
LYMPHOCYTES # BLD: 1.4 K/UL (ref 0.9–3.6)
LYMPHOCYTES NFR BLD: 9 % (ref 21–52)
MAGNESIUM SERPL-MCNC: 2.3 MG/DL (ref 1.6–2.6)
MCH RBC QN AUTO: 29.3 PG (ref 24–34)
MCHC RBC AUTO-ENTMCNC: 35.3 G/DL (ref 31–37)
MCV RBC AUTO: 83 FL (ref 78–100)
MONOCYTES # BLD: 1.2 K/UL (ref 0.05–1.2)
MONOCYTES NFR BLD: 8 % (ref 3–10)
NEUTS SEG # BLD: 13.3 K/UL (ref 1.8–8)
NEUTS SEG NFR BLD: 83 % (ref 40–73)
NRBC # BLD: 0.07 K/UL (ref 0–0.01)
NRBC BLD-RTO: 0.4 PER 100 WBC
PCO2 BLD: 22.2 MMHG (ref 35–45)
PH BLD: 7.51 (ref 7.35–7.45)
PHOSPHATE SERPL-MCNC: 2.1 MG/DL (ref 2.5–4.9)
PLATELET # BLD AUTO: 111 K/UL (ref 135–420)
PMV BLD AUTO: 10.8 FL (ref 9.2–11.8)
PO2 BLD: 104 MMHG (ref 80–100)
POTASSIUM SERPL-SCNC: 3 MMOL/L (ref 3.5–5.5)
POTASSIUM SERPL-SCNC: 3.1 MMOL/L (ref 3.5–5.5)
RBC # BLD AUTO: 2.7 M/UL (ref 4.35–5.65)
SAO2 % BLD: 98.7 % (ref 92–97)
SERVICE CMNT-IMP: ABNORMAL
SODIUM SERPL-SCNC: 155 MMOL/L (ref 136–145)
SODIUM SERPL-SCNC: 156 MMOL/L (ref 136–145)
SPECIMEN EXP DATE BLD: NORMAL
SPECIMEN TYPE: ABNORMAL
TRIGL SERPL-MCNC: 126 MG/DL
TRIGL SERPL-MCNC: 150 MG/DL
UNIT DIVISION: 0
UNIT ISSUE DATE/TIME: NORMAL
VANCOMYCIN SERPL-MCNC: 21.9 UG/ML (ref 5–40)
WBC # BLD AUTO: 16 K/UL (ref 4.6–13.2)

## 2024-06-15 PROCEDURE — 51702 INSERT TEMP BLADDER CATH: CPT

## 2024-06-15 PROCEDURE — 2000000000 HC ICU R&B

## 2024-06-15 PROCEDURE — 6360000002 HC RX W HCPCS: Performed by: INTERNAL MEDICINE

## 2024-06-15 PROCEDURE — 2580000003 HC RX 258: Performed by: INTERNAL MEDICINE

## 2024-06-15 PROCEDURE — 6370000000 HC RX 637 (ALT 250 FOR IP): Performed by: PHYSICIAN ASSISTANT

## 2024-06-15 PROCEDURE — 2580000003 HC RX 258: Performed by: PHYSICIAN ASSISTANT

## 2024-06-15 PROCEDURE — 94761 N-INVAS EAR/PLS OXIMETRY MLT: CPT

## 2024-06-15 PROCEDURE — 83605 ASSAY OF LACTIC ACID: CPT

## 2024-06-15 PROCEDURE — 80202 ASSAY OF VANCOMYCIN: CPT

## 2024-06-15 PROCEDURE — 80048 BASIC METABOLIC PNL TOTAL CA: CPT

## 2024-06-15 PROCEDURE — 36415 COLL VENOUS BLD VENIPUNCTURE: CPT

## 2024-06-15 PROCEDURE — 85018 HEMOGLOBIN: CPT

## 2024-06-15 PROCEDURE — 99291 CRITICAL CARE FIRST HOUR: CPT | Performed by: INTERNAL MEDICINE

## 2024-06-15 PROCEDURE — 84478 ASSAY OF TRIGLYCERIDES: CPT

## 2024-06-15 PROCEDURE — 37799 UNLISTED PX VASCULAR SURGERY: CPT

## 2024-06-15 PROCEDURE — 6360000002 HC RX W HCPCS: Performed by: PHYSICIAN ASSISTANT

## 2024-06-15 PROCEDURE — 2580000003 HC RX 258

## 2024-06-15 PROCEDURE — 99233 SBSQ HOSP IP/OBS HIGH 50: CPT | Performed by: SURGERY

## 2024-06-15 PROCEDURE — 2700000000 HC OXYGEN THERAPY PER DAY

## 2024-06-15 PROCEDURE — 6360000002 HC RX W HCPCS: Performed by: STUDENT IN AN ORGANIZED HEALTH CARE EDUCATION/TRAINING PROGRAM

## 2024-06-15 PROCEDURE — 82803 BLOOD GASES ANY COMBINATION: CPT

## 2024-06-15 PROCEDURE — 82962 GLUCOSE BLOOD TEST: CPT

## 2024-06-15 PROCEDURE — 84100 ASSAY OF PHOSPHORUS: CPT

## 2024-06-15 PROCEDURE — 71045 X-RAY EXAM CHEST 1 VIEW: CPT

## 2024-06-15 PROCEDURE — 83735 ASSAY OF MAGNESIUM: CPT

## 2024-06-15 PROCEDURE — 85014 HEMATOCRIT: CPT

## 2024-06-15 PROCEDURE — 74018 RADEX ABDOMEN 1 VIEW: CPT

## 2024-06-15 PROCEDURE — 85025 COMPLETE CBC W/AUTO DIFF WBC: CPT

## 2024-06-15 PROCEDURE — 94003 VENT MGMT INPAT SUBQ DAY: CPT

## 2024-06-15 PROCEDURE — C9113 INJ PANTOPRAZOLE SODIUM, VIA: HCPCS | Performed by: PHYSICIAN ASSISTANT

## 2024-06-15 PROCEDURE — 6360000002 HC RX W HCPCS

## 2024-06-15 RX ORDER — POTASSIUM CHLORIDE 29.8 MG/ML
20 INJECTION INTRAVENOUS ONCE
Status: COMPLETED | OUTPATIENT
Start: 2024-06-15 | End: 2024-06-15

## 2024-06-15 RX ORDER — POTASSIUM CHLORIDE 29.8 MG/ML
20 INJECTION INTRAVENOUS
Status: COMPLETED | OUTPATIENT
Start: 2024-06-15 | End: 2024-06-15

## 2024-06-15 RX ADMIN — CHLORHEXIDINE GLUCONATE 0.12% ORAL RINSE 15 ML: 1.2 LIQUID ORAL at 08:22

## 2024-06-15 RX ADMIN — PANTOPRAZOLE SODIUM 40 MG: 40 INJECTION, POWDER, LYOPHILIZED, FOR SOLUTION INTRAVENOUS at 16:37

## 2024-06-15 RX ADMIN — DOPAMINE HYDROCHLORIDE 5 MCG/KG/MIN: 160 INJECTION, SOLUTION INTRAVENOUS at 00:30

## 2024-06-15 RX ADMIN — CHLORHEXIDINE GLUCONATE 0.12% ORAL RINSE 15 ML: 1.2 LIQUID ORAL at 20:32

## 2024-06-15 RX ADMIN — DOPAMINE HYDROCHLORIDE 5 MCG/KG/MIN: 160 INJECTION, SOLUTION INTRAVENOUS at 14:20

## 2024-06-15 RX ADMIN — PIPERACILLIN AND TAZOBACTAM 3375 MG: 3; .375 INJECTION, POWDER, FOR SOLUTION INTRAVENOUS at 16:30

## 2024-06-15 RX ADMIN — POTASSIUM CHLORIDE 20 MEQ: 29.8 INJECTION INTRAVENOUS at 22:03

## 2024-06-15 RX ADMIN — FENTANYL CITRATE 50 MCG/HR: at 08:20

## 2024-06-15 RX ADMIN — DEXTROSE MONOHYDRATE: 50 INJECTION, SOLUTION INTRAVENOUS at 22:13

## 2024-06-15 RX ADMIN — SODIUM CHLORIDE, PRESERVATIVE FREE 10 ML: 5 INJECTION INTRAVENOUS at 22:04

## 2024-06-15 RX ADMIN — PIPERACILLIN AND TAZOBACTAM 3375 MG: 3; .375 INJECTION, POWDER, FOR SOLUTION INTRAVENOUS at 00:14

## 2024-06-15 RX ADMIN — PIPERACILLIN AND TAZOBACTAM 3375 MG: 3; .375 INJECTION, POWDER, FOR SOLUTION INTRAVENOUS at 08:19

## 2024-06-15 RX ADMIN — OCTREOTIDE ACETATE 25 MCG/HR: 500 INJECTION, SOLUTION INTRAVENOUS; SUBCUTANEOUS at 02:54

## 2024-06-15 RX ADMIN — POTASSIUM CHLORIDE 20 MEQ: 29.8 INJECTION, SOLUTION INTRAVENOUS at 04:18

## 2024-06-15 RX ADMIN — POTASSIUM CHLORIDE 20 MEQ: 29.8 INJECTION INTRAVENOUS at 20:38

## 2024-06-15 RX ADMIN — SODIUM CHLORIDE, PRESERVATIVE FREE 10 ML: 5 INJECTION INTRAVENOUS at 22:03

## 2024-06-15 RX ADMIN — PANTOPRAZOLE SODIUM 40 MG: 40 INJECTION, POWDER, LYOPHILIZED, FOR SOLUTION INTRAVENOUS at 04:18

## 2024-06-15 RX ADMIN — PIPERACILLIN AND TAZOBACTAM 3375 MG: 3; .375 INJECTION, POWDER, FOR SOLUTION INTRAVENOUS at 23:30

## 2024-06-15 ASSESSMENT — PULMONARY FUNCTION TESTS
PIF_VALUE: 26
PIF_VALUE: 24
PIF_VALUE: 26
PIF_VALUE: 28

## 2024-06-15 ASSESSMENT — PAIN SCALES - GENERAL
PAINLEVEL_OUTOF10: 0

## 2024-06-15 NOTE — CONSULTS
General Surgery Consult    Alycia Clement  Admit date: 2024    MRN: [id number]     : 1955     Age: 69 y.o.        Attending Physician: Sang Avila MD [facility]      History of Present Illness:      Alycia Clement is a 69 y.o. male who I was consulted by the intensive care unit and the GI team for evaluation of upper GI bleed secondary to large duodenal ulcer.  The patient is intubated so I took the history from the ICU team and the chart.  The patient presented with upper GI bleed and he has multiple severe comorbidities and he is upper endoscopy showed a large duodenal bulb ulcer with a visible vessel but no active bleeding.  The patient is currently intubated in the ICU with no tachycardia and no hypotension though his last blood pressure was slightly low.  He has been having repeat hemoglobin and hematocrit and they have been slightly dropping with the last one showing a hemoglobin of 7.7.     Patient Active Problem List    Diagnosis Date Noted    Hemorrhagic shock (HCC) 2024    Acute respiratory failure with hypoxia (HCC) 2024    Goals of care, counseling/discussion 2024    Encounter for palliative care 2024    Acute encephalopathy 2024    Acute blood loss anemia 2024    History of CVA with residual deficit 2024    History of creation of ventriculoperitoneal shunt 2024    Debility 2024    Septic shock due to urinary tract infection (HCC) 2024    LORETTA (acute kidney injury) (HCC) 2022    High anion gap metabolic acidosis 2022    Acute respiratory failure with hypoxemia (HCC) 2022    Neutrophilic leukocytosis 2022    Other acute kidney failure (HCC) 2022    Septic shock (HCC) 2022    Hemiparesis affecting right side as late effect of stroke (HCC) 2022    Recurrent major depressive disorder, in partial remission (HCC) 2022    BPH (benign prostatic hyperplasia) 2022    Other hyperlipidemia  to take the patient for surgery to try to suture ligate the gastroduodenal artery which again is a major surgery for this patient.     Plan:     We will try to avoid surgery at all cost  IR consultation for possible embolization of the gastroduodenal artery  If the measure by gastroenterology team and the interventional radiology fail then we have no option but to proceed with surgical intervention if he continues to bleed and this will be for exploratory laparotomy and ligation of the gastroduodenal artery  Correction of any coagulopathy  Palliative medicine consultation  Management per ICU team  I did spent more than 75 minutes on the care of the patient and I discussed that with the ICU team.     Please call me if you have any questions (cell phone: [phone])     Signed By: Sang Avila MD     June 14, 2024

## 2024-06-15 NOTE — PROGRESS NOTES
No SBT at this time-     06/15/24 0825   Weaning Parameters   Spontaneous Breathing Trial Complete (S)  No (comment)  (Sedated on Fentanyl)

## 2024-06-15 NOTE — PROGRESS NOTES
[facility].  Pulmonary, Critical Care, and Sleep Medicine    Name: Alycia Clement MRN: [id number]   : 1955 Hospital: [facility]   Date: 6/15/2024  Admission Date: 2024     Chart and notes reviewed. Data reviewed. I have evaluated all findings.    [x]I have reviewed the flowsheet and previous day’s notes.    []The patient is unable to give any meaningful history or review of systems because the patient is:  [x]Intubated [x]Sedated   []Unresponsive      []The patient is critically ill on      [x]Mechanical ventilation [x]Pressors   []BiPAP []     Interval HPI:  Patient is a 69 y.o. male with PMHx of previous CVA with residual right hemiparesis and aphasia, BPH with indwelling Friedman catheter, history of  shunt, CKD, GERD, multiple admission for UTI, brought in by EMS from nursing facility due to altered mental status, unknown last known well. Pt tachycardic, hypotensive, and short of breath on presentation. Work-up significant for leukocytosis, WBC 19k, lactic acidosis, anemia with Hgb of 5, LORETTA, hyperglycemia. UA positive for LE, pyuria, bacteruria. CXR negative. CTH negative for acute process. Pt refractory to 2 L IVF so started on Levophed and right fe CVL inserted. Pt also receiving 2 units PRBC. GI was consulted with plans to perform EGD in the morning pending clinical course. Pt will be admitted to our service.   : Pt rapidly decompensated hemodynamically and was found to have bright red blood in his mouth. Pt was emergently intubated. Additional blood products ordered and started on Octreotide.     Subjective   Hospital Day:3   Vent Day:  Overnight events: No acute overnight events  Neuro: sedated on Fentanyl drip  Respiratory/ Secretions: FIO2:28%, PEEP:5  Hemodynamics: On dopamine drip  Urine output, bowel: Chronic friedman, 1.9 L urine output, stable LORETTA  Diet:  NPO; OGT to LIWS  Need for procedures: None              ROS:Pertinent items are noted in

## 2024-06-15 NOTE — PROGRESS NOTES
[facility]   Pharmacy Pharmacokinetic Monitoring Service - Vancomycin    Indication: sepsis  Goal level: 10-20 mg/L  Day of Therapy: 4  Additional Antimicrobials: pip-tazo    Pertinent Laboratory Values:   Wt Readings from Last 1 Encounters:   06/15/24 77.6 kg (171 lb)     Temp Readings from Last 1 Encounters:   06/15/24 98.6 °F (37 °C) (Axillary)     Estimated Creatinine Clearance: 37 mL/min (A) (based on SCr of 1.99 mg/dL (H)).    Recent Labs     06/14/24  0301 06/14/24  2045 06/15/24  0400   CREATININE 2.06* 2.04* 1.99*   * 115* 105*   WBC 30.6*  --  16.0*     Pertinent Cultures:  Date Source Results   6/12 blood NGTD   6/12 urine gnr   MRSA Nasal Swab: negative    Assessment:  Date Current Dose Level (mg/L) Timing of Level (h)   6/12 1,750 mg x1 - -   6/13 1,250 mg x1 17.3 13   6/14 - 25.8 18   6/15 - 21.9      Plan:  Dose by level  No dose today  Ordered a level for 6/16 with AM labs  Pharmacy will continue to monitor patient and adjust therapy as indicated    Thank you for the consult,  RAIMUNDO SAMANO RPH  6/15/2024

## 2024-06-15 NOTE — CONSULTS
Nutrition Note    Admitted from NH for AMS.On , pt decompensated, emergently intubated. Consult noted for TF ordering and management. Surgery following for upper GIB 2/2 large duodenal ulcer. Discussed care with ICU PA. Still having bloody output from OGT. Continue NPO status for today. Will continue to monitor readiness for nutrition support.     Nutrition Related Findings:   Pertinent Meds: D5 @ 75 ml/hr (90g dex, 306 kcal), dopamine @ 5 mcg/min, octreotide  Pertinent Labs: Na 156 H (trending up), K 3.1 L, BUN/Cr 105/1.99, GFR 36, Phos 2.1 L   Last BM: 06/15/24  Skin: Wound Type: None  Edema: extremity    Estimated Nutrition Needs: 77.6 kg  1599 - 1955 kcals/day ([facility] 2003b x 0.9-1.1)  Minute Volume (L/min): 8.16 Liters  Temp: 98.6 °F (37 °C)  Temp (24hrs), Av.7 °F (37.1 °C), Min:98 °F (36.7 °C), Max:99.3 °F (37.4 °C)    931 - 1940 kcals/day (12-25 kcals/kg)  93 - 155 g protein (1.2-2 g/kg)  1599 - 1955 ml fluid (1 ml/kcal)    Nutrition Recommendations/Plan:   If/when okay with MD, initiate tube feeding regimen:   Formula: Vital AF 1.2 (Peptide Based)  Start at 20 ml/hr  Advance as tolerated by 10 ml q 6 hours  Goal Rate: 75 ml/hr x 20 hours (for anticipation of holding tube feeds for standard nursing care)  Water Flushes: 50 mL q 1 hours (1200 mL total) - or per MD  Goal Regimen Provides (with modulars):  1800 kcal, 113g protein, 2415 ml free water, 100% RDIs  D5 per MD.  Continue to monitor tolerance of EN, weight, labs, and plan of care during admission.      Electronically signed by Megan Dockweiler, MS, RD, CNSC on 6/15/2024 at 9:28 AM.    Contact: [phone]

## 2024-06-15 NOTE — PROGRESS NOTES
VENTILATOR CARE PLAN    Problem: Ventilator Management  Goal: *Adequate oxygenation/ ventilation/ and extubation      Patient:        Alycia Clement     69 y.o.   male     6/15/2024  2:34 PM  Patient Active Problem List   Diagnosis    Urine retention    Urinary tract infection without hematuria    Acute cystitis without hematuria    Acute respiratory failure with hypoxemia (HCC)    LORETTA (acute kidney injury) (HCC)    Anemia    Backache    BPH (benign prostatic hyperplasia)    Chronic neck pain    Constipation    Dementia (HCC)    Environmental and seasonal allergies    Essential hypertension    Gastroesophageal reflux disease without esophagitis    Hemiparesis affecting right side as late effect of stroke (HCC)    High anion gap metabolic acidosis    Hydrocephalus, communicating (HCC)    Neutrophilic leukocytosis    Other acute kidney failure (HCC)    Other hyperlipidemia    Prediabetes    Recurrent major depressive disorder, in partial remission (HCC)    Septic shock (HCC)    Sleep difficulties    Vitamin D deficiency    Hemorrhagic shock (HCC)    Acute respiratory failure with hypoxia (HCC)    Goals of care, counseling/discussion    Encounter for palliative care    Acute encephalopathy    Acute blood loss anemia    History of CVA with residual deficit    History of creation of ventriculoperitoneal shunt    Debility    Septic shock due to urinary tract infection (HCC)       Septic shock (HCC) [A41.9, R65.21]    Reason patient intubated: Airway protection secondary to septic shock with AMS.     Ventilator day: 3    Ventilator settings: PRVC RR 14, vT 500, PEEP 5, FiO2 28%    ETT Size/Placement: 7.5, 23 cm at upper left lip.     Wean Screen Pass (Yes or No): No  Wean Screen Reason for Failure: Sedated.  Duration of Weaning Trial: -  Additional Comments: -        PLAN OF CARE: Maintain adequate ventilation and oxygenation, wean vent settings as tolerated, SBT as tolerated when appropriate.        GOAL:

## 2024-06-15 NOTE — PLAN OF CARE
Problem: Safety - Adult  Goal: Free from fall injury  Outcome: /[facility] Progressing     Problem: ABCDS Injury Assessment  Goal: Absence of physical injury  Outcome: /[facility] Progressing     Problem: Skin/Tissue Integrity  Goal: Absence of new skin breakdown  Description: 1.  Monitor for areas of redness and/or skin breakdown  2.  Assess vascular access sites hourly  3.  Every 4-6 hours minimum:  Change oxygen saturation probe site  4.  Every 4-6 hours:  If on nasal continuous positive airway pressure, respiratory therapy assess nares and determine need for appliance change or resting period.  Outcome: /[facility] Progressing     Problem: Safety - Medical Restraint  Goal: Remains free of injury from restraints (Restraint for Interference with Medical Device)  Description: INTERVENTIONS:  1. Determine that other, less restrictive measures have been tried or would not be effective before applying the restraint  2. Evaluate the patient's condition at the time of restraint application  3. Inform patient/family regarding the reason for restraint  4. Q2H: Monitor safety, psychosocial status, comfort, nutrition and hydration  Outcome: /[facility] Progressing  Flowsheets  Taken 6/15/2024 0800 by Popeye Suárez RN  Remains free of injury from restraints (restraint for interference with medical device):   Determine that other, less restrictive measures have been tried or would not be effective before applying the restraint   Evaluate the patient's condition at the time of restraint application   Inform patient/family regarding the reason for restraint   Every 2 hours: Monitor safety, psychosocial status, comfort, nutrition and hydration  Taken 6/15/2024 0600 by Cam Singh RN  Remains free of injury from restraints (restraint for interference with medical device):   Determine that other, less restrictive measures have been tried or would not be effective before applying the restraint   Evaluate the patient's condition at the

## 2024-06-15 NOTE — PROGRESS NOTES
General Surgery Consult    Alycia Clement  Admit date: 2024    MRN: [id number]     : 1955     Age: 69 y.o.        Attending Physician: Sang Avila MD [facility]      History of Present Illness:      Alycia Clement is a 69 y.o. male who we are following for evaluation of upper GI bleed secondary to large duodenal ulcer.  Overnight the patient did not have any active bleeding and he had bowel movement after giving him an enema that was dark which is expected.  According to the nurse there was no bleeding per the NG tube.  The patient is intubated and his vitals show no fever or tachycardia and he has no hypotension.    Patient Active Problem List    Diagnosis Date Noted    Hemorrhagic shock (HCC) 2024    Acute respiratory failure with hypoxia ([facility]) 2024    Goals of care, counseling/discussion 2024    Encounter for palliative care 2024    Acute encephalopathy 2024    Acute blood loss anemia 2024    History of CVA with residual deficit 2024    History of creation of ventriculoperitoneal shunt 2024    Debility 2024    Septic shock due to urinary tract infection ([facility]) 2024    LORETTA (acute kidney injury) ([facility]) 2022    High anion gap metabolic acidosis 2022    Acute respiratory failure with hypoxemia ([facility]) 2022    Neutrophilic leukocytosis 2022    Other acute kidney failure (HCC) 2022    Septic shock ([facility]) 2022    Hemiparesis affecting right side as late effect of stroke ([facility]) 2022    Recurrent major depressive disorder, in partial remission ([facility]) 2022    BPH (benign prostatic hyperplasia) 2022    Other hyperlipidemia 2022    Urinary tract infection without hematuria 2020    Prediabetes 2019    Chronic neck pain 2019    Constipation 2019    Environmental and seasonal allergies 2019    Urine retention 2019    Sleep difficulties 2018    Acute cystitis without  despite GI and interventional radiology attempt to stop the bleeding we may need to do surgical intervention.  Currently the patient is doing much better and no evidence of bleeding so we will continue to follow-up and we will continue to avoid the surgery.     Plan:     Management per ICU  Repeat hemoglobin and hematocrit  Correction of any coagulopathy and avoid anticoagulation  Consider IR intervention for embolization of the gastroduodenal artery or repetition of the upper endoscopy if he bleeds again.  PPI  We will continue to avoid surgery however if he has perforation or if he continued to bleed despite intervention by the gastroenterology and interventional radiology team then we will have to proceed with surgical intervention    Please call me if you have any questions (cell phone: [phone])     Signed By: Sang Avila MD     Barbi 15, 2024

## 2024-06-15 NOTE — PROGRESS NOTES
[email or web address]  [phone]    Gastroenterology follow up-Progress note    Impression:  1. Septic Shock - complicated UTI w/ hx chronic indwelling friedman  - [facility] for same  2. Acute on chronic anemia - large high risk gastric ulcer noted on EGD 6/14  -EGD 6/14/2024-large amount of blood in stomach-fundus could not be cleared; 2 cm deep proximal duodenal bulb ulcer with overlying clot and high risk stigmata-not amenable to endoscopic therapy.  - past history of PUD w/ EGD 11/2022 w/ non-bleeding gastric ulcer w/ no signs of recent bleed, duodenitis, gastric bx: reactive gastropathy gastric ulcer: focally denuded mucosa with chronic inflammation  3. Hx CVA w/ chronic R sided deficits, s/p  Shunt 2014  4. Acute metabolic/toxic encephalopathy  5. LORETTA on CKD3        Plan:  1. Continue IV PPI BID-can consider switching to drip  2. If there are signs of active ongoing bleeding, recommend IR embolization of Gastroduodenal artery. High risk candidate for laparotomy, but may have to consider that as a last resort. Appreciate Dr. Avila's inputs.   3. Monitor H/H, transfuse per protocol  4. Continue medical management per primary team   5. NPO    Chief Complaint: acute anemia      Subjective:  Pt seen in ICU; appears hemodynamically stable, some dark blood in NG this morning     ROS: unable to obtain      General: ill appearing, pale  Eyes: sclera anicteric  Cardiovascular: not tachycardic  Pulmonary: intubation being performed  Abdominal: appearance normal; NG in place    Patient Active Problem List   Diagnosis    Urine retention    Urinary tract infection without hematuria    Acute cystitis without hematuria    Acute respiratory failure with hypoxemia (HCC)    LORETTA (acute kidney injury) (HCC)    Anemia    Backache    BPH (benign prostatic hyperplasia)    Chronic neck pain    Constipation    Dementia (HCC)    Environmental and seasonal allergies    Essential hypertension

## 2024-06-16 ENCOUNTER — APPOINTMENT (OUTPATIENT)
Facility: HOSPITAL | Age: 69
DRG: 698 | End: 2024-06-16
Payer: MEDICARE

## 2024-06-16 LAB
ANION GAP SERPL CALC-SCNC: 7 MMOL/L (ref 3–18)
ARTERIAL PATENCY WRIST A: POSITIVE
BACTERIA SPEC CULT: ABNORMAL
BASE DEFICIT BLD-SCNC: 4.9 MMOL/L
BASOPHILS # BLD: 0 K/UL (ref 0–0.1)
BASOPHILS NFR BLD: 0 % (ref 0–2)
BDY SITE: ABNORMAL
BUN SERPL-MCNC: 72 MG/DL (ref 7–18)
BUN/CREAT SERPL: 37 (ref 12–20)
CALCIUM SERPL-MCNC: 8.6 MG/DL (ref 8.5–10.1)
CC UR VC: ABNORMAL
CHLORIDE SERPL-SCNC: 128 MMOL/L (ref 100–111)
CO2 SERPL-SCNC: 19 MMOL/L (ref 21–32)
CREAT SERPL-MCNC: 1.93 MG/DL (ref 0.6–1.3)
DIFFERENTIAL METHOD BLD: ABNORMAL
EOSINOPHIL # BLD: 0.1 K/UL (ref 0–0.4)
EOSINOPHIL NFR BLD: 1 % (ref 0–5)
ERYTHROCYTE [DISTWIDTH] IN BLOOD BY AUTOMATED COUNT: 16.6 % (ref 11.6–14.5)
GAS FLOW.O2 O2 DELIVERY SYS: ABNORMAL
GAS FLOW.O2 SETTING OXYMISER: 20 BPM
GLUCOSE BLD STRIP.AUTO-MCNC: 130 MG/DL (ref 70–110)
GLUCOSE BLD STRIP.AUTO-MCNC: 155 MG/DL (ref 70–110)
GLUCOSE BLD STRIP.AUTO-MCNC: 159 MG/DL (ref 70–110)
GLUCOSE BLD STRIP.AUTO-MCNC: 161 MG/DL (ref 70–110)
GLUCOSE SERPL-MCNC: 161 MG/DL (ref 74–99)
HCO3 BLD-SCNC: 18.4 MMOL/L (ref 22–26)
HCT VFR BLD AUTO: 21.7 % (ref 36–48)
HCT VFR BLD AUTO: 22.6 % (ref 36–48)
HCT VFR BLD AUTO: 22.9 % (ref 36–48)
HGB BLD-MCNC: 7.1 G/DL (ref 13–16)
HGB BLD-MCNC: 7.5 G/DL (ref 13–16)
HGB BLD-MCNC: 7.7 G/DL (ref 13–16)
IMM GRANULOCYTES # BLD AUTO: 0.1 K/UL (ref 0–0.04)
IMM GRANULOCYTES NFR BLD AUTO: 1 % (ref 0–0.5)
IPAP/PIP/HIGH PEEP: 24
LYMPHOCYTES # BLD: 2.1 K/UL (ref 0.9–3.6)
LYMPHOCYTES NFR BLD: 15 % (ref 21–52)
MAGNESIUM SERPL-MCNC: 2.1 MG/DL (ref 1.6–2.6)
MCH RBC QN AUTO: 28.8 PG (ref 24–34)
MCHC RBC AUTO-ENTMCNC: 33.6 G/DL (ref 31–37)
MCV RBC AUTO: 85.8 FL (ref 78–100)
MONOCYTES # BLD: 1.1 K/UL (ref 0.05–1.2)
MONOCYTES NFR BLD: 8 % (ref 3–10)
NEUTS SEG # BLD: 10.3 K/UL (ref 1.8–8)
NEUTS SEG NFR BLD: 75 % (ref 40–73)
NRBC # BLD: 0.03 K/UL (ref 0–0.01)
NRBC BLD-RTO: 0.2 PER 100 WBC
O2/TOTAL GAS SETTING VFR VENT: 28 %
PAW @ MEAN EXP FLOW ON VENT: 12 CMH2O
PCO2 BLD: 26.2 MMHG (ref 35–45)
PEEP RESPIRATORY: 5 CMH2O
PH BLD: 7.45 (ref 7.35–7.45)
PHOSPHATE SERPL-MCNC: 1.8 MG/DL (ref 2.5–4.9)
PLATELET # BLD AUTO: 118 K/UL (ref 135–420)
PMV BLD AUTO: 10.4 FL (ref 9.2–11.8)
PO2 BLD: 109 MMHG (ref 80–100)
POTASSIUM SERPL-SCNC: 3.4 MMOL/L (ref 3.5–5.5)
RBC # BLD AUTO: 2.67 M/UL (ref 4.35–5.65)
RESPIRATORY RATE, POC: 24 (ref 5–40)
SAO2 % BLD: 98.6 % (ref 92–97)
SERVICE CMNT-IMP: ABNORMAL
SERVICE CMNT-IMP: ABNORMAL
SODIUM SERPL-SCNC: 154 MMOL/L (ref 136–145)
SPECIMEN TYPE: ABNORMAL
VENTILATION MODE VENT: ABNORMAL
VT SETTING VENT: 500 ML
WBC # BLD AUTO: 13.6 K/UL (ref 4.6–13.2)

## 2024-06-16 PROCEDURE — 6360000002 HC RX W HCPCS: Performed by: PHYSICIAN ASSISTANT

## 2024-06-16 PROCEDURE — 37799 UNLISTED PX VASCULAR SURGERY: CPT

## 2024-06-16 PROCEDURE — 94003 VENT MGMT INPAT SUBQ DAY: CPT

## 2024-06-16 PROCEDURE — 6360000002 HC RX W HCPCS: Performed by: STUDENT IN AN ORGANIZED HEALTH CARE EDUCATION/TRAINING PROGRAM

## 2024-06-16 PROCEDURE — 94761 N-INVAS EAR/PLS OXIMETRY MLT: CPT

## 2024-06-16 PROCEDURE — 82962 GLUCOSE BLOOD TEST: CPT

## 2024-06-16 PROCEDURE — 84100 ASSAY OF PHOSPHORUS: CPT

## 2024-06-16 PROCEDURE — 99291 CRITICAL CARE FIRST HOUR: CPT | Performed by: INTERNAL MEDICINE

## 2024-06-16 PROCEDURE — 2580000003 HC RX 258

## 2024-06-16 PROCEDURE — C9113 INJ PANTOPRAZOLE SODIUM, VIA: HCPCS | Performed by: INTERNAL MEDICINE

## 2024-06-16 PROCEDURE — 71045 X-RAY EXAM CHEST 1 VIEW: CPT

## 2024-06-16 PROCEDURE — 6370000000 HC RX 637 (ALT 250 FOR IP): Performed by: PHYSICIAN ASSISTANT

## 2024-06-16 PROCEDURE — 2580000003 HC RX 258: Performed by: PHYSICIAN ASSISTANT

## 2024-06-16 PROCEDURE — 99233 SBSQ HOSP IP/OBS HIGH 50: CPT | Performed by: SURGERY

## 2024-06-16 PROCEDURE — 2700000000 HC OXYGEN THERAPY PER DAY

## 2024-06-16 PROCEDURE — 85018 HEMOGLOBIN: CPT

## 2024-06-16 PROCEDURE — 2500000003 HC RX 250 WO HCPCS

## 2024-06-16 PROCEDURE — 2580000003 HC RX 258: Performed by: INTERNAL MEDICINE

## 2024-06-16 PROCEDURE — 2000000000 HC ICU R&B

## 2024-06-16 PROCEDURE — 83735 ASSAY OF MAGNESIUM: CPT

## 2024-06-16 PROCEDURE — 80048 BASIC METABOLIC PNL TOTAL CA: CPT

## 2024-06-16 PROCEDURE — 6360000002 HC RX W HCPCS: Performed by: INTERNAL MEDICINE

## 2024-06-16 PROCEDURE — 82803 BLOOD GASES ANY COMBINATION: CPT

## 2024-06-16 PROCEDURE — 85025 COMPLETE CBC W/AUTO DIFF WBC: CPT

## 2024-06-16 PROCEDURE — C9113 INJ PANTOPRAZOLE SODIUM, VIA: HCPCS | Performed by: PHYSICIAN ASSISTANT

## 2024-06-16 PROCEDURE — 85014 HEMATOCRIT: CPT

## 2024-06-16 PROCEDURE — 74018 RADEX ABDOMEN 1 VIEW: CPT

## 2024-06-16 RX ADMIN — DEXTROSE MONOHYDRATE: 50 INJECTION, SOLUTION INTRAVENOUS at 23:07

## 2024-06-16 RX ADMIN — CHLORHEXIDINE GLUCONATE 0.12% ORAL RINSE 15 ML: 1.2 LIQUID ORAL at 11:58

## 2024-06-16 RX ADMIN — SODIUM CHLORIDE, PRESERVATIVE FREE 10 ML: 5 INJECTION INTRAVENOUS at 09:00

## 2024-06-16 RX ADMIN — CHLORHEXIDINE GLUCONATE 0.12% ORAL RINSE 15 ML: 1.2 LIQUID ORAL at 21:11

## 2024-06-16 RX ADMIN — PIPERACILLIN AND TAZOBACTAM 3375 MG: 3; .375 INJECTION, POWDER, FOR SOLUTION INTRAVENOUS at 08:00

## 2024-06-16 RX ADMIN — DOPAMINE HYDROCHLORIDE 5 MCG/KG/MIN: 160 INJECTION, SOLUTION INTRAVENOUS at 06:57

## 2024-06-16 RX ADMIN — SODIUM CHLORIDE, PRESERVATIVE FREE 10 ML: 5 INJECTION INTRAVENOUS at 21:11

## 2024-06-16 RX ADMIN — PIPERACILLIN AND TAZOBACTAM 3375 MG: 3; .375 INJECTION, POWDER, FOR SOLUTION INTRAVENOUS at 15:30

## 2024-06-16 RX ADMIN — PANTOPRAZOLE SODIUM 8 MG/HR: 40 INJECTION, POWDER, FOR SOLUTION INTRAVENOUS at 15:14

## 2024-06-16 RX ADMIN — DEXTROSE MONOHYDRATE: 50 INJECTION, SOLUTION INTRAVENOUS at 11:03

## 2024-06-16 RX ADMIN — PANTOPRAZOLE SODIUM 40 MG: 40 INJECTION, POWDER, LYOPHILIZED, FOR SOLUTION INTRAVENOUS at 03:49

## 2024-06-16 RX ADMIN — POTASSIUM PHOSPHATE, MONOBASIC POTASSIUM PHOSPHATE, DIBASIC 20 MMOL: 224; 236 INJECTION, SOLUTION, CONCENTRATE INTRAVENOUS at 12:02

## 2024-06-16 RX ADMIN — DOPAMINE HYDROCHLORIDE 5 MCG/KG/MIN: 160 INJECTION, SOLUTION INTRAVENOUS at 23:05

## 2024-06-16 ASSESSMENT — PULMONARY FUNCTION TESTS
PIF_VALUE: 25
PIF_VALUE: 21
PIF_VALUE: 21
PIF_VALUE: 25
PIF_VALUE: 20
PIF_VALUE: 19
PIF_VALUE: 22

## 2024-06-16 ASSESSMENT — PAIN SCALES - GENERAL
PAINLEVEL_OUTOF10: 0

## 2024-06-16 NOTE — PROGRESS NOTES
Patients seen and examined  LORETTA on CKD3a  baseline creat 1.1-1.5  Etiology prerenal azotemia v/s Ischaemic ATN   Hypernatremia  Hypokalemia   Metabolic acidoses  Bradycardia     Plan:  1) d5w + 20 meq of Kcl @ 75 cc/hrs   2) monitor electrolytes and renal functions  daily  3) avoid NSAIds , IV contrast  4) renally dose AB for egfr < 30    Please call with questions    Santana Earl MD FASN  Cell [phone]  Pager: [phone]  Fax   [phone]

## 2024-06-16 NOTE — PLAN OF CARE
Problem: Discharge Planning  Goal: Discharge to home or other facility with appropriate resources  Outcome: Progressing     Problem: Safety - Adult  Goal: Free from fall injury  Outcome: Progressing     Problem: Pain  Goal: Verbalizes/displays adequate comfort level or baseline comfort level  Outcome: Progressing  Flowsheets  Taken 6/16/2024 1600  Verbalizes/displays adequate comfort level or baseline comfort level: Encourage patient to monitor pain and request assistance  Taken 6/16/2024 1200  Verbalizes/displays adequate comfort level or baseline comfort level: Encourage patient to monitor pain and request assistance  Taken 6/16/2024 0800  Verbalizes/displays adequate comfort level or baseline comfort level: Encourage patient to monitor pain and request assistance     Problem: ABCDS Injury Assessment  Goal: Absence of physical injury  Outcome: Progressing     Problem: Skin/Tissue Integrity  Goal: Absence of new skin breakdown  Description: 1.  Monitor for areas of redness and/or skin breakdown  2.  Assess vascular access sites hourly  3.  Every 4-6 hours minimum:  Change oxygen saturation probe site  4.  Every 4-6 hours:  If on nasal continuous positive airway pressure, respiratory therapy assess nares and determine need for appliance change or resting period.  Outcome: Progressing     Problem: Safety - Medical Restraint  Goal: Remains free of injury from restraints (Restraint for Interference with Medical Device)  Description: INTERVENTIONS:  1. Determine that other, less restrictive measures have been tried or would not be effective before applying the restraint  2. Evaluate the patient's condition at the time of restraint application  3. Inform patient/family regarding the reason for restraint  4. Q2H: Monitor safety, psychosocial status, comfort, nutrition and hydration  Outcome: Progressing  Flowsheets (Taken 6/16/2024 0400 by Channing Rico)  Remains free of injury from restraints (restraint for interference  with medical device):   Determine that other, less restrictive measures have been tried or would not be effective before applying the restraint   Evaluate the patient's condition at the time of restraint application   Inform patient/family regarding the reason for restraint   Every 2 hours: Monitor safety, psychosocial status, comfort, nutrition and hydration     Problem: Nutrition Deficit:  Goal: Optimize nutritional status  Outcome: Progressing

## 2024-06-16 NOTE — PLAN OF CARE
Problem: Discharge Planning  Goal: Discharge to home or other facility with appropriate resources  Outcome: Progressing     Problem: Safety - Adult  Goal: Free from fall injury  Outcome: Progressing     Problem: Pain  Goal: Verbalizes/displays adequate comfort level or baseline comfort level  Outcome: Progressing     Problem: ABCDS Injury Assessment  Goal: Absence of physical injury  Outcome: Progressing     Problem: Skin/Tissue Integrity  Goal: Absence of new skin breakdown  Description: 1.  Monitor for areas of redness and/or skin breakdown  2.  Assess vascular access sites hourly  3.  Every 4-6 hours minimum:  Change oxygen saturation probe site  4.  Every 4-6 hours:  If on nasal continuous positive airway pressure, respiratory therapy assess nares and determine need for appliance change or resting period.  Outcome: Progressing     Problem: Safety - Medical Restraint  Goal: Remains free of injury from restraints (Restraint for Interference with Medical Device)  Description: INTERVENTIONS:  1. Determine that other, less restrictive measures have been tried or would not be effective before applying the restraint  2. Evaluate the patient's condition at the time of restraint application  3. Inform patient/family regarding the reason for restraint  4. Q2H: Monitor safety, psychosocial status, comfort, nutrition and hydration  Outcome: Progressing  Flowsheets  Taken 6/16/2024 0000 by Channing Rico  Remains free of injury from restraints (restraint for interference with medical device):   Determine that other, less restrictive measures have been tried or would not be effective before applying the restraint   Evaluate the patient's condition at the time of restraint application   Inform patient/family regarding the reason for restraint   Every 2 hours: Monitor safety, psychosocial status, comfort, nutrition and hydration  Taken 6/15/2024 2200 by Channing Rico  Remains free of injury from restraints (restraint for  interference with medical device):   Determine that other, less restrictive measures have been tried or would not be effective before applying the restraint   Evaluate the patient's condition at the time of restraint application   Inform patient/family regarding the reason for restraint   Every 2 hours: Monitor safety, psychosocial status, comfort, nutrition and hydration  Taken 6/15/2024 2000 by Channing Rico  Remains free of injury from restraints (restraint for interference with medical device):   Determine that other, less restrictive measures have been tried or would not be effective before applying the restraint   Evaluate the patient's condition at the time of restraint application   Inform patient/family regarding the reason for restraint   Every 2 hours: Monitor safety, psychosocial status, comfort, nutrition and hydration  Taken 6/15/2024 1600 by Popeye Suárez RN  Remains free of injury from restraints (restraint for interference with medical device):   Determine that other, less restrictive measures have been tried or would not be effective before applying the restraint   Evaluate the patient's condition at the time of restraint application   Inform patient/family regarding the reason for restraint   Every 2 hours: Monitor safety, psychosocial status, comfort, nutrition and hydration  Taken 6/15/2024 1400 by Popeye Suárez RN  Remains free of injury from restraints (restraint for interference with medical device):   Determine that other, less restrictive measures have been tried or would not be effective before applying the restraint   Evaluate the patient's condition at the time of restraint application   Inform patient/family regarding the reason for restraint   Every 2 hours: Monitor safety, psychosocial status, comfort, nutrition and hydration  Taken 6/15/2024 1200 by Popeye Suárez RN  Remains free of injury from restraints (restraint for interference with medical device):   Evaluate

## 2024-06-16 NOTE — PROGRESS NOTES
Rt at bedside for assessment. Pt remains stable. No signs of distress noted.    06/16/24 0359   Patient Observation   Pulse 66   Respirations 21   SpO2 100 %   Breath Sounds   Respiratory Pattern Regular   Breath Sounds Bilateral Diminished   Ventilator Settings   FiO2  28 %   Insp Time (sec) 0.8 sec   Resp Rate (Set) (S)  16 bpm  (rr changed due to co2 26)   Target Vt 500   PEEP/CPAP (cmH2O) 5   Vent Patient Data (Readings)   Vt Spont (mL) 436 mL   Vt Mandatory Exp (mL) 584 mL   Vt (Measured) 490 mL   Peak Inspiratory Pressure (cmH2O) 25 cmH2O   Rate Measured 21 br/min   Minute Volume (L/min) 11.2 Liters   Mean Airway Pressure (cmH2O) 11 cmH20   Plateau Pressure (cm H2O) 24 cm H2O   Driving Pressure 19   Inspiratory Time 0.8 sec   I:E Ratio 1:2.8   Flow Sensitivity 3 L/min   Disconnect Sensitivity (%) 75 %   Expiratory Sensitivity (%) 25 %   PEEP Intrinsic (cm H2O) 0 cm H2O   Static Compliance (L/cm H2O) 41   Insp Rise Time (%) 50 %   Backup Apnea On   Backup Rate 20 Breaths Per Minute   Airway Clearance   Suction ET Tube   Subglottic Suction Done Yes   Suction Device Inline suction catheter   Sputum Amount Moderate   Sputum Color/Odor White;Tan   Sputum Consistency Thick   ETT    Placement Date/Time: 06/13/24 0945   Present on Admission/Arrival: No  Placed By: Licensed provider  Placement Verified By: Auscultation;Capnometry;Chest X-ray;Direct visualization  Preoxygenation: Yes  Mask Ventilation: Ventilated by mask (1)  Techni...   Secured At 23 cm   Measured From Lips   ETT Placement Center   Secured By Commercial tube dhillon   Site Assessment Cool;Dry   Cuff Pressure 29 cm H2O   Tie/Dhillon Changed No   Ventilator Associated Pneumonia Bundle   Elevation of Head of Bed to 30-45 Degrees  Yes   Skin Assessment   Skin Assessment Clean, dry, & intact   Skin Protection for O2 Device Yes   Location Cheek;Lip;Tongue   Interventions Reposition device

## 2024-06-16 NOTE — PROGRESS NOTES
[facility].  Pulmonary, Critical Care, and Sleep Medicine    Name: Alycia Clement MRN: [id number]   : 1955 Hospital: [facility]   Date: 2024  Admission Date: 2024     Chart and notes reviewed. Data reviewed. I have evaluated all findings.    [x]I have reviewed the flowsheet and previous day’s notes.    [x]The patient is unable to give any meaningful history or review of systems because the patient is:  [x]Intubated []Sedated   []Unresponsive      [x]The patient is critically ill on      [x]Mechanical ventilation []Pressors   []BiPAP []         Interval HPI:  Patient is a 69 y.o. male with PMHx of previous CVA with residual right hemiparesis and aphasia, BPH with indwelling Malin catheter, history of  shunt, CKD, GERD, multiple admission for UTI, brought in by EMS from nursing facility due to altered mental status, unknown last known well. Pt tachycardic, hypotensive, and short of breath on presentation. Work-up significant for leukocytosis, WBC 19k, lactic acidosis, anemia with Hgb of 5, LORETTA, hyperglycemia. UA positive for LE, pyuria, bacteruria. CXR negative. CTH negative for acute process. Pt refractory to 2 L IVF so started on Levophed and right fe CVL inserted. Pt also receiving 2 units PRBC. GI was consulted with plans to perform EGD in the morning pending clinical course. Pt will be admitted to our service.   : Pt rapidly decompensated hemodynamically and was found to have bright red blood in his mouth. Pt was emergently intubated. Additional blood products ordered and started on Octreotide.     Subjective 24  Hospital Day: 4  Vent Day: 24  Overnight events: Intermittently bradycardic overnight  Mentation/Activity: lethargic but follows commands  Respiratory/ Secretions: minimal secretions; FiO2 28% / peep 5cwp.  Hemodynamics: on low dose Dopamine due to bradycardia  Urine output, bowel: UOP adequate.  Diet: NPO; OGT to LIWS  Need for  -1120.83 ml            Telemetry: []Sinus []A-flutter []Paced    []A-fib []Multiple PVC’s                  Physical Exam:      General: Intubated/sedated  HEENT:  Anicteric sclerae; pink palpebral conjunctivae; mucosa moist  Resp: Scattered rhonchi.  No wheezes.  No tachypnea or accessory muscle use.  No ventilator dyssynchrony  CV:  S1, S2 present; regular rate and rhythm  GI:  Abdomen soft, non-tender; hypoactive bowel sounds.  Extremities:  +2 pulses on all extremities; no edema/ cyanosis/ clubbing noted   Skin:  Warm; no rashes/ lesions noted, normal turgor/cap refill   Neurologic:  Non-focal  Devices: OGT, Central line, ETT      DATA:  MAR reviewed and pertinent medications noted or modified as needed    Labs:    Recent Labs     06/16/24  0237 06/15/24  2236 06/15/24  1747 06/15/24  0913 06/15/24  0400 06/14/24  1015 06/14/24  0301   WBC 13.6*  --   --   --  16.0*  --  30.6*   HGB 7.7* 7.8* 7.7*   < > 7.9*   < > 7.1*   HCT 22.9* 23.2* 22.7*   < > 22.4*   < > 21.2*   MCV 85.8  --   --   --  83.0  --  86.5   *  --   --   --  111*  --  110*    < > = values in this interval not displayed.      Lab Results   Component Value Date/Time     06/16/2024 02:37 AM    K 3.4 06/16/2024 02:37 AM     06/16/2024 02:37 AM    CO2 19 06/16/2024 02:37 AM    BUN 72 06/16/2024 02:37 AM    CREATININE 1.93 06/16/2024 02:37 AM    GLUCOSE 161 06/16/2024 02:37 AM    CALCIUM 8.6 06/16/2024 02:37 AM       Lab Results   Component Value Date    ALT 11 (L) 06/12/2024    AST 10 06/12/2024    ALKPHOS 54 06/12/2024    BILITOT 0.2 06/12/2024      No results found for: \"DDIMER\"   Lab Results   Component Value Date    YAV7YSX 3.35 06/12/2024      Hemoglobin A1C   Date Value Ref Range Status   06/14/2024 5.4 4.2 - 5.6 % Final     Comment:     (NOTE)  HbA1C Interpretive Ranges  <5.7              Normal  5.7 - 6.4         Consider Prediabetes  >6.5              Consider Diabetes        No results found for: \"APTT\"   Lab Results

## 2024-06-16 NOTE — PROGRESS NOTES
[email or web address]  [phone]    Gastroenterology follow up-Progress note    Impression:  1. Septic Shock - complicated UTI w/ hx chronic indwelling friedman  - recent hospitalization @[facility] last month for same  2. Acute on chronic anemia - large high risk gastric ulcer noted on EGD 6/14  -EGD 6/14/2024-large amount of blood in stomach-fundus could not be cleared; 2 cm deep proximal duodenal bulb ulcer with overlying clot and high risk stigmata-not amenable to endoscopic therapy.  - past history of PUD w/ EGD 11/2022 w/ non-bleeding gastric ulcer w/ no signs of recent bleed, duodenitis, gastric bx: reactive gastropathy gastric ulcer: focally denuded mucosa with chronic inflammation  3. Hx CVA w/ chronic R sided deficits, s/p  Shunt 2014  4. Acute metabolic/toxic encephalopathy  5. LORETTA on CKD3        Plan:  1. Continue PPI-can consider switching to drip to maximize acid control especially given that endoscopic intervention was not performed.  2. If there are signs of active ongoing bleeding, recommend IR embolization of Gastroduodenal artery. High risk candidate for laparotomy, but may have to consider that as a last resort. Appreciate Dr. Avila's inputs.   3. Monitor H/H, transfuse per protocol  4. Continue medical management per primary team   5. NPO    Chief Complaint: acute anemia      Subjective:  Pt seen in ICU; appears hemodynamically stable, discussed with ICU team-Dr. Ortega/NEELAM Wheeler.     ROS: unable to obtain      General: ill appearing, pale  Eyes: sclera anicteric  Cardiovascular: not tachycardic  Pulmonary: intubation being performed  Abdominal: appearance normal; NG in place    Patient Active Problem List   Diagnosis    Urine retention    Urinary tract infection without hematuria    Acute cystitis without hematuria    Acute respiratory failure with hypoxemia (HCC)    LORETTA (acute kidney injury) (HCC)    Anemia    Backache    BPH (benign prostatic hyperplasia)    Chronic neck pain     Constipation    Dementia (HCC)    Environmental and seasonal allergies    Essential hypertension    Gastroesophageal reflux disease without esophagitis    Hemiparesis affecting right side as late effect of stroke (HCC)    High anion gap metabolic acidosis    Hydrocephalus, communicating (HCC)    Neutrophilic leukocytosis    Other acute kidney failure (HCC)    Other hyperlipidemia    Prediabetes    Recurrent major depressive disorder, in partial remission (HCC)    Septic shock (HCC)    Sleep difficulties    Vitamin D deficiency    Hemorrhagic shock (HCC)    Acute respiratory failure with hypoxia (HCC)    Goals of care, counseling/discussion    Encounter for palliative care    Acute encephalopathy    Acute blood loss anemia    History of CVA with residual deficit    History of creation of ventriculoperitoneal shunt    Debility    Septic shock due to urinary tract infection (HCC)         /69   Pulse (!) 49   Temp 98.7 °F (37.1 °C) (Oral)   Resp 16   Ht 1.778 m (5' 10\")   Wt 77.6 kg (171 lb)   SpO2 100%   BMI 24.54 kg/m²         Intake/Output Summary (Last 24 hours) at 6/16/2024 1039  Last data filed at 6/16/2024 0700  Gross per 24 hour   Intake 1929.17 ml   Output 3050 ml   Net -1120.83 ml       CBC w/Diff    Lab Results   Component Value Date/Time    WBC 13.6 (H) 06/16/2024 02:37 AM    RBC 2.67 (L) 06/16/2024 02:37 AM    HGB 7.7 (L) 06/16/2024 02:37 AM    HCT 22.9 (L) 06/16/2024 02:37 AM    MCV 85.8 06/16/2024 02:37 AM    MCH 28.8 06/16/2024 02:37 AM    MCHC 33.6 06/16/2024 02:37 AM    RDW 16.6 (H) 06/16/2024 02:37 AM     (L) 06/16/2024 02:37 AM    MPV 10.4 06/16/2024 02:37 AM    Lab Results   Component Value Date/Time    BANDS 1 06/14/2024 03:01 AM      Basic Metabolic Profile   Recent Labs     06/16/24  0237   *   K 3.4*   *   CO2 19*   BUN 72*   GLUCOSE 161*   MG 2.1   PHOS 1.8*        Hepatic Function    No results found for: \"TP\" No components found for: \"SGOT\", \"GPT\", \"DBILI\",

## 2024-06-16 NOTE — PROGRESS NOTES
General Surgery Consult    Alycia Clement  Admit date: 2024    MRN: [id number]     : 1955     Age: 69 y.o.        Attending Physician: Sang Avila MD [facility]      History of Present Illness:      Alycia Clement is a 69 y.o. male who we are following for evaluation of upper GI bleed secondary to large duodenal ulcer.  Overnight the patient did not have any active bleeding.  No bleeding per the NG tube.  The patient is intubated and his vitals show no fever or tachycardia and he has no hypotension.     Patient Active Problem List    Diagnosis Date Noted    Hemorrhagic shock (HCC) 2024    Acute respiratory failure with hypoxia ([facility]) 2024    Goals of care, counseling/discussion 2024    Encounter for palliative care 2024    Acute encephalopathy 2024    Acute blood loss anemia 2024    History of CVA with residual deficit 2024    History of creation of ventriculoperitoneal shunt 2024    Debility 2024    Septic shock due to urinary tract infection (HCC) 2024    LORETTA (acute kidney injury) ([facility]) 2022    High anion gap metabolic acidosis 2022    Acute respiratory failure with hypoxemia ([facility]) 2022    Neutrophilic leukocytosis 2022    Other acute kidney failure (HCC) 2022    Septic shock ([facility]) 2022    Hemiparesis affecting right side as late effect of stroke ([facility]) 2022    Recurrent major depressive disorder, in partial remission ([facility]) 2022    BPH (benign prostatic hyperplasia) 2022    Other hyperlipidemia 2022    Urinary tract infection without hematuria 2020    Prediabetes 2019    Chronic neck pain 2019    Constipation 2019    Environmental and seasonal allergies 2019    Urine retention 2019    Sleep difficulties 2018    Acute cystitis without hematuria 2018    Backache 2017    Vitamin D deficiency 2016    Anemia 2015    Dementia     Stroke Brother     Hypertension Mother     Stroke Mother     Diabetes Sister     Hypertension Sister     Diabetes Brother     Diabetes Mother     Hypertension Brother       Current Facility-Administered Medications   Medication Dose Route Frequency    potassium phosphate 20 mmol in sodium chloride 0.9 % 250 mL IVPB for central line  20 mmol IntraVENous Once    0.9 % sodium chloride infusion   IntraVENous PRN    dextrose 5 % solution   IntraVENous Continuous    piperacillin-tazobactam (ZOSYN) 3,375 mg in dextrose 5 % 50 mL IVPB  3,375 mg IntraVENous Q8H    chlorhexidine (PERIDEX) 0.12 % solution 15 mL  15 mL Mouth/Throat BID    DOPamine (INTROPIN) 1600 mcg/mL in dextrose 5% infusion  5 mcg/kg/min IntraVENous Continuous    glucose chewable tablet 16 g  4 tablet Oral PRN    dextrose bolus 10% 125 mL  125 mL IntraVENous PRN    Or    dextrose bolus 10% 250 mL  250 mL IntraVENous PRN    Glucagon Emergency SOLR 1 mg  1 mg SubCUTAneous PRN    dextrose 10 % infusion   IntraVENous Continuous PRN    insulin lispro (HUMALOG,ADMELOG) injection vial 0-4 Units  0-4 Units SubCUTAneous Q6H    fentaNYL (SUBLIMAZE) injection 100 mcg  100 mcg IntraVENous Q15 Min PRN    midazolam PF (VERSED) injection 2 mg  2 mg IntraVENous Q15 Min PRN    0.9 % sodium chloride infusion   IntraVENous PRN    0.9 % sodium chloride infusion   IntraVENous PRN    sodium chloride flush 0.9 % injection 5-40 mL  5-40 mL IntraVENous 2 times per day    sodium chloride flush 0.9 % injection 5-40 mL  5-40 mL IntraVENous PRN    0.9 % sodium chloride infusion   IntraVENous PRN    potassium chloride (KLOR-CON M) extended release tablet 40 mEq  40 mEq Oral PRN    Or    potassium bicarb-citric acid (EFFER-K) effervescent tablet 40 mEq  40 mEq Oral PRN    Or    potassium chloride 10 mEq/100 mL IVPB (Peripheral Line)  10 mEq IntraVENous PRN    magnesium sulfate 2000 mg in 50 mL IVPB premix  2,000 mg IntraVENous PRN    ondansetron (ZOFRAN-ODT) disintegrating tablet 4 mg

## 2024-06-16 NOTE — PROGRESS NOTES
Rt at bedside for assessment. No signs of distress noted will continue to monitor and assess respiratory vitals   06/15/24 2015   Patient Observation   Pulse 85   Respirations 20   SpO2 100 %   Breath Sounds   Respiratory Pattern Regular   Upper Airway Sounds Coarse   Breath Sounds Bilateral Coarse crackles   Vent Information   Ventilator Day(s) 3   Vent Mode AC/PRVC   Ventilator Settings   FiO2  28 %   Insp Time (sec) 0.8 sec   Resp Rate (Set) 20 bpm   Target Vt 500   PEEP/CPAP (cmH2O) 5   Vent Patient Data (Readings)   Vt Spont (mL) 509 mL   Vt Mandatory Exp (mL) 528 mL   Vt (Measured) 542 mL   Peak Inspiratory Pressure (cmH2O) 28 cmH2O   Rate Measured 20 br/min   Minute Volume (L/min) 10.2 Liters   Mean Airway Pressure (cmH2O) 11 cmH20   Plateau Pressure (cm H2O) 24 cm H2O   Driving Pressure 19   Inspiratory Time 0.8 sec   I:E Ratio 1:2.8   Disconnect Sensitivity (%) 75 %   Expiratory Sensitivity (%) 25 %   PEEP Intrinsic (cm H2O) 4 cm H2O   Static Compliance (L/cm H2O) 52   Insp Rise Time (%) 50 %   Backup Apnea On   Backup Rate 20 Breaths Per Minute   Backup Vt 500   Backup I Time   (no value)   Vent Alarm Settings   Low Pressure (cmH2O) 11 cmH2O   High Pressure (cmH2O) 40 cmH2O   Low Minute Volume (lpm) 2 L/min   High Minute Volume (lpm) 20 L/min   Low Exhaled Vt (ml) 200 mL   High Exhaled Vt (ml) 1000 mL   RR High (bpm) 40 br/min   Apnea (secs) 20 secs   Additional Respiratoray Assessments   Humidification Source Heated wire   Humidification Temp 36.5   Circuit Condensation Drained   Ambu Bag With Mask At Bedside Yes   Airway Clearance   Suction ET Tube   Subglottic Suction Done Yes   Suction Device Inline suction catheter   Sputum Color/Odor White   Sputum Consistency Thick   ETT    Placement Date/Time: 06/13/24 0945   Present on Admission/Arrival: No  Placed By: Licensed provider  Placement Verified By: Auscultation;Capnometry;Chest X-ray;Direct visualization  Preoxygenation: Yes  Mask Ventilation:

## 2024-06-16 NOTE — PROGRESS NOTES
VENTILATOR CARE PLAN     Problem: Ventilator Management  Goal: *Adequate oxygenation/ ventilation/ and extubation        Patient:         Alycia Clement     69 y.o.   male     6/16/2024        Patient Active Problem List   Diagnosis    Urine retention    Urinary tract infection without hematuria    Acute cystitis without hematuria    Acute respiratory failure with hypoxemia (HCC)    LORETTA (acute kidney injury) (HCC)    Anemia    Backache    BPH (benign prostatic hyperplasia)    Chronic neck pain    Constipation    Dementia (HCC)    Environmental and seasonal allergies    Essential hypertension    Gastroesophageal reflux disease without esophagitis    Hemiparesis affecting right side as late effect of stroke (HCC)    High anion gap metabolic acidosis    Hydrocephalus, communicating (HCC)    Neutrophilic leukocytosis    Other acute kidney failure (HCC)    Other hyperlipidemia    Prediabetes    Recurrent major depressive disorder, in partial remission (HCC)    Septic shock (HCC)    Sleep difficulties    Vitamin D deficiency    Hemorrhagic shock (HCC)    Acute respiratory failure with hypoxia (HCC)    Goals of care, counseling/discussion    Encounter for palliative care    Acute encephalopathy    Acute blood loss anemia    History of CVA with residual deficit    History of creation of ventriculoperitoneal shunt    Debility    Septic shock due to urinary tract infection (HCC)         Septic shock (HCC) [A41.9, R65.21]     Reason patient intubated: Airway protection secondary to septic shock with AMS.         Ventilator settings: AC/VC+, F 16 vT 500, PEEP 5, FiO2 28%     ETT Size/Placement: 7.5, 23 cm at upper left lip.     Wean Screen Pass (Yes or No): Yes  Wean Screen Reason for Failure: 0  Duration of Weaning Trial: - Started wean at 1000  Additional Comments: - PS 14, PEEP 5 28%, wean as tolerated during the day, and rest on full support at night. Today weaned for 7.5 hours on CPAP PS 14, PEEP 5 28%. Tolerated well. Plan

## 2024-06-17 ENCOUNTER — APPOINTMENT (OUTPATIENT)
Facility: HOSPITAL | Age: 69
DRG: 698 | End: 2024-06-17
Payer: MEDICARE

## 2024-06-17 PROBLEM — T83.511A URINARY TRACT INFECTION ASSOCIATED WITH INDWELLING URETHRAL CATHETER (HCC): Status: ACTIVE | Noted: 2020-01-02

## 2024-06-17 PROBLEM — N39.0 URINARY TRACT INFECTION ASSOCIATED WITH INDWELLING URETHRAL CATHETER (HCC): Status: ACTIVE | Noted: 2020-01-02

## 2024-06-17 LAB
ANION GAP SERPL CALC-SCNC: 7 MMOL/L (ref 3–18)
ANION GAP SERPL CALC-SCNC: 8 MMOL/L (ref 3–18)
ARTERIAL PATENCY WRIST A: ABNORMAL
BASE DEFICIT BLD-SCNC: 4.2 MMOL/L
BASOPHILS # BLD: 0 K/UL (ref 0–0.1)
BASOPHILS NFR BLD: 0 % (ref 0–2)
BDY SITE: ABNORMAL
BUN SERPL-MCNC: 35 MG/DL (ref 7–18)
BUN SERPL-MCNC: 47 MG/DL (ref 7–18)
BUN/CREAT SERPL: 23 (ref 12–20)
BUN/CREAT SERPL: 29 (ref 12–20)
CALCIUM SERPL-MCNC: 7.6 MG/DL (ref 8.5–10.1)
CALCIUM SERPL-MCNC: 8.2 MG/DL (ref 8.5–10.1)
CHLORIDE SERPL-SCNC: 121 MMOL/L (ref 100–111)
CHLORIDE SERPL-SCNC: 122 MMOL/L (ref 100–111)
CO2 SERPL-SCNC: 20 MMOL/L (ref 21–32)
CO2 SERPL-SCNC: 20 MMOL/L (ref 21–32)
CREAT SERPL-MCNC: 1.53 MG/DL (ref 0.6–1.3)
CREAT SERPL-MCNC: 1.6 MG/DL (ref 0.6–1.3)
DIFFERENTIAL METHOD BLD: ABNORMAL
EOSINOPHIL # BLD: 0.3 K/UL (ref 0–0.4)
EOSINOPHIL NFR BLD: 3 % (ref 0–5)
ERYTHROCYTE [DISTWIDTH] IN BLOOD BY AUTOMATED COUNT: 16.4 % (ref 11.6–14.5)
GAS FLOW.O2 O2 DELIVERY SYS: ABNORMAL
GAS FLOW.O2 SETTING OXYMISER: 16 BPM
GLUCOSE BLD STRIP.AUTO-MCNC: 106 MG/DL (ref 70–110)
GLUCOSE BLD STRIP.AUTO-MCNC: 118 MG/DL (ref 70–110)
GLUCOSE BLD STRIP.AUTO-MCNC: 142 MG/DL (ref 70–110)
GLUCOSE BLD STRIP.AUTO-MCNC: 145 MG/DL (ref 70–110)
GLUCOSE SERPL-MCNC: 143 MG/DL (ref 74–99)
GLUCOSE SERPL-MCNC: 157 MG/DL (ref 74–99)
HCO3 BLD-SCNC: 18.4 MMOL/L (ref 22–26)
HCT VFR BLD AUTO: 22 % (ref 36–48)
HCT VFR BLD AUTO: 23.1 % (ref 36–48)
HGB BLD-MCNC: 7.3 G/DL (ref 13–16)
HGB BLD-MCNC: 7.6 G/DL (ref 13–16)
IMM GRANULOCYTES # BLD AUTO: 0.1 K/UL (ref 0–0.04)
IMM GRANULOCYTES NFR BLD AUTO: 1 % (ref 0–0.5)
INSPIRATION.DURATION SETTING TIME VENT: 0.8 SEC
IPAP/PIP/HIGH PEEP: 27
LYMPHOCYTES # BLD: 1.9 K/UL (ref 0.9–3.6)
LYMPHOCYTES NFR BLD: 14 % (ref 21–52)
MAGNESIUM SERPL-MCNC: 1.7 MG/DL (ref 1.6–2.6)
MCH RBC QN AUTO: 29 PG (ref 24–34)
MCHC RBC AUTO-ENTMCNC: 32.9 G/DL (ref 31–37)
MCV RBC AUTO: 88.2 FL (ref 78–100)
MONOCYTES # BLD: 1 K/UL (ref 0.05–1.2)
MONOCYTES NFR BLD: 8 % (ref 3–10)
NEUTS SEG # BLD: 9.8 K/UL (ref 1.8–8)
NEUTS SEG NFR BLD: 75 % (ref 40–73)
NRBC # BLD: 0.03 K/UL (ref 0–0.01)
NRBC BLD-RTO: 0.2 PER 100 WBC
O2/TOTAL GAS SETTING VFR VENT: 28 %
PAW @ MEAN EXP FLOW ON VENT: 18 CMH2O
PCO2 BLD: 24.3 MMHG (ref 35–45)
PEEP RESPIRATORY: 5 CMH2O
PH BLD: 7.49 (ref 7.35–7.45)
PHOSPHATE SERPL-MCNC: 3.4 MG/DL (ref 2.5–4.9)
PLATELET # BLD AUTO: 121 K/UL (ref 135–420)
PMV BLD AUTO: 10.7 FL (ref 9.2–11.8)
PO2 BLD: 112 MMHG (ref 80–100)
POTASSIUM SERPL-SCNC: 3.1 MMOL/L (ref 3.5–5.5)
POTASSIUM SERPL-SCNC: 3.2 MMOL/L (ref 3.5–5.5)
RBC # BLD AUTO: 2.62 M/UL (ref 4.35–5.65)
RESPIRATORY RATE, POC: 18 (ref 5–40)
SAO2 % BLD: 98.8 % (ref 92–97)
SERVICE CMNT-IMP: ABNORMAL
SODIUM SERPL-SCNC: 148 MMOL/L (ref 136–145)
SODIUM SERPL-SCNC: 150 MMOL/L (ref 136–145)
SPECIMEN TYPE: ABNORMAL
VENTILATION MODE VENT: ABNORMAL
VT SETTING VENT: 500 ML
WBC # BLD AUTO: 13.2 K/UL (ref 4.6–13.2)

## 2024-06-17 PROCEDURE — 37799 UNLISTED PX VASCULAR SURGERY: CPT

## 2024-06-17 PROCEDURE — 82962 GLUCOSE BLOOD TEST: CPT

## 2024-06-17 PROCEDURE — C9113 INJ PANTOPRAZOLE SODIUM, VIA: HCPCS | Performed by: INTERNAL MEDICINE

## 2024-06-17 PROCEDURE — 86850 RBC ANTIBODY SCREEN: CPT

## 2024-06-17 PROCEDURE — 86923 COMPATIBILITY TEST ELECTRIC: CPT

## 2024-06-17 PROCEDURE — 2580000003 HC RX 258: Performed by: INTERNAL MEDICINE

## 2024-06-17 PROCEDURE — 6360000002 HC RX W HCPCS: Performed by: REGISTERED NURSE

## 2024-06-17 PROCEDURE — 85025 COMPLETE CBC W/AUTO DIFF WBC: CPT

## 2024-06-17 PROCEDURE — 6360000002 HC RX W HCPCS

## 2024-06-17 PROCEDURE — 94761 N-INVAS EAR/PLS OXIMETRY MLT: CPT

## 2024-06-17 PROCEDURE — 85018 HEMOGLOBIN: CPT

## 2024-06-17 PROCEDURE — 86901 BLOOD TYPING SEROLOGIC RH(D): CPT

## 2024-06-17 PROCEDURE — APPSS30 APP SPLIT SHARED TIME 16-30 MINUTES: Performed by: REGISTERED NURSE

## 2024-06-17 PROCEDURE — 6360000002 HC RX W HCPCS: Performed by: INTERNAL MEDICINE

## 2024-06-17 PROCEDURE — 86900 BLOOD TYPING SEROLOGIC ABO: CPT

## 2024-06-17 PROCEDURE — 84100 ASSAY OF PHOSPHORUS: CPT

## 2024-06-17 PROCEDURE — 2580000003 HC RX 258: Performed by: PHYSICIAN ASSISTANT

## 2024-06-17 PROCEDURE — 80048 BASIC METABOLIC PNL TOTAL CA: CPT

## 2024-06-17 PROCEDURE — 6370000000 HC RX 637 (ALT 250 FOR IP): Performed by: PHYSICIAN ASSISTANT

## 2024-06-17 PROCEDURE — 83735 ASSAY OF MAGNESIUM: CPT

## 2024-06-17 PROCEDURE — 99291 CRITICAL CARE FIRST HOUR: CPT | Performed by: INTERNAL MEDICINE

## 2024-06-17 PROCEDURE — 71045 X-RAY EXAM CHEST 1 VIEW: CPT

## 2024-06-17 PROCEDURE — 85014 HEMATOCRIT: CPT

## 2024-06-17 PROCEDURE — 94003 VENT MGMT INPAT SUBQ DAY: CPT

## 2024-06-17 PROCEDURE — 99233 SBSQ HOSP IP/OBS HIGH 50: CPT | Performed by: SURGERY

## 2024-06-17 PROCEDURE — 2000000000 HC ICU R&B

## 2024-06-17 PROCEDURE — 82803 BLOOD GASES ANY COMBINATION: CPT

## 2024-06-17 RX ORDER — POTASSIUM CHLORIDE 29.8 MG/ML
20 INJECTION INTRAVENOUS ONCE
Status: COMPLETED | OUTPATIENT
Start: 2024-06-17 | End: 2024-06-17

## 2024-06-17 RX ORDER — MAGNESIUM SULFATE 1 G/100ML
1000 INJECTION INTRAVENOUS ONCE
Status: COMPLETED | OUTPATIENT
Start: 2024-06-17 | End: 2024-06-17

## 2024-06-17 RX ORDER — POTASSIUM CHLORIDE, DEXTROSE MONOHYDRATE 150; 5 MG/100ML; G/100ML
INJECTION, SOLUTION INTRAVENOUS CONTINUOUS
Status: DISPENSED | OUTPATIENT
Start: 2024-06-17 | End: 2024-06-18

## 2024-06-17 RX ORDER — MAGNESIUM SULFATE IN WATER 40 MG/ML
2000 INJECTION, SOLUTION INTRAVENOUS ONCE
Status: COMPLETED | OUTPATIENT
Start: 2024-06-17 | End: 2024-06-17

## 2024-06-17 RX ORDER — DOPAMINE HYDROCHLORIDE 160 MG/100ML
5 INJECTION, SOLUTION INTRAVENOUS CONTINUOUS
Status: DISCONTINUED | OUTPATIENT
Start: 2024-06-17 | End: 2024-06-17

## 2024-06-17 RX ORDER — POTASSIUM CHLORIDE 29.8 MG/ML
20 INJECTION INTRAVENOUS
Status: COMPLETED | OUTPATIENT
Start: 2024-06-17 | End: 2024-06-17

## 2024-06-17 RX ADMIN — PANTOPRAZOLE SODIUM 8 MG/HR: 40 INJECTION, POWDER, FOR SOLUTION INTRAVENOUS at 16:26

## 2024-06-17 RX ADMIN — PANTOPRAZOLE SODIUM 8 MG/HR: 40 INJECTION, POWDER, FOR SOLUTION INTRAVENOUS at 11:11

## 2024-06-17 RX ADMIN — SODIUM CHLORIDE, PRESERVATIVE FREE 10 ML: 5 INJECTION INTRAVENOUS at 08:39

## 2024-06-17 RX ADMIN — POTASSIUM CHLORIDE 20 MEQ: 29.8 INJECTION, SOLUTION INTRAVENOUS at 15:07

## 2024-06-17 RX ADMIN — SODIUM CHLORIDE, PRESERVATIVE FREE 10 ML: 5 INJECTION INTRAVENOUS at 21:00

## 2024-06-17 RX ADMIN — PIPERACILLIN AND TAZOBACTAM 3375 MG: 3; .375 INJECTION, POWDER, FOR SOLUTION INTRAVENOUS at 00:12

## 2024-06-17 RX ADMIN — PANTOPRAZOLE SODIUM 8 MG/HR: 40 INJECTION, POWDER, FOR SOLUTION INTRAVENOUS at 00:12

## 2024-06-17 RX ADMIN — MAGNESIUM SULFATE HEPTAHYDRATE 1000 MG: 1 INJECTION, SOLUTION INTRAVENOUS at 13:55

## 2024-06-17 RX ADMIN — PIPERACILLIN AND TAZOBACTAM 3375 MG: 3; .375 INJECTION, POWDER, FOR SOLUTION INTRAVENOUS at 08:54

## 2024-06-17 RX ADMIN — PANTOPRAZOLE SODIUM 8 MG/HR: 40 INJECTION, POWDER, FOR SOLUTION INTRAVENOUS at 21:21

## 2024-06-17 RX ADMIN — POTASSIUM CHLORIDE 20 MEQ: 29.8 INJECTION, SOLUTION INTRAVENOUS at 14:00

## 2024-06-17 RX ADMIN — MAGNESIUM SULFATE HEPTAHYDRATE 2000 MG: 40 INJECTION, SOLUTION INTRAVENOUS at 08:38

## 2024-06-17 RX ADMIN — PANTOPRAZOLE SODIUM 8 MG/HR: 40 INJECTION, POWDER, FOR SOLUTION INTRAVENOUS at 04:54

## 2024-06-17 RX ADMIN — POTASSIUM CHLORIDE 20 MEQ: 29.8 INJECTION, SOLUTION INTRAVENOUS at 07:00

## 2024-06-17 RX ADMIN — POTASSIUM CHLORIDE AND DEXTROSE MONOHYDRATE: 150; 5 INJECTION, SOLUTION INTRAVENOUS at 11:27

## 2024-06-17 RX ADMIN — CHLORHEXIDINE GLUCONATE 0.12% ORAL RINSE 15 ML: 1.2 LIQUID ORAL at 08:39

## 2024-06-17 RX ADMIN — PIPERACILLIN AND TAZOBACTAM 3375 MG: 3; .375 INJECTION, POWDER, FOR SOLUTION INTRAVENOUS at 16:27

## 2024-06-17 ASSESSMENT — PULMONARY FUNCTION TESTS
PIF_VALUE: 10
PIF_VALUE: 22
PIF_VALUE: 17
PIF_VALUE: 12

## 2024-06-17 ASSESSMENT — PAIN SCALES - GENERAL
PAINLEVEL_OUTOF10: 0

## 2024-06-17 NOTE — PROGRESS NOTES
VENTILATOR CARE PLAN    Problem: Ventilator Management  Goal: *Adequate oxygenation/ ventilation/ and extubation      Patient:        Alycia Clement     69 y.o.   male     6/17/2024  6:11 AM  Patient Active Problem List   Diagnosis    Urine retention    Urinary tract infection without hematuria    Acute cystitis without hematuria    Acute respiratory failure with hypoxemia (HCC)    LORETTA (acute kidney injury) (HCC)    Anemia    Backache    BPH (benign prostatic hyperplasia)    Chronic neck pain    Constipation    Dementia (HCC)    Environmental and seasonal allergies    Essential hypertension    Gastroesophageal reflux disease without esophagitis    Hemiparesis affecting right side as late effect of stroke (HCC)    High anion gap metabolic acidosis    Hydrocephalus, communicating (HCC)    Neutrophilic leukocytosis    Other acute kidney failure (HCC)    Other hyperlipidemia    Prediabetes    Recurrent major depressive disorder, in partial remission (HCC)    Septic shock (HCC)    Sleep difficulties    Vitamin D deficiency    Hemorrhagic shock (HCC)    Acute respiratory failure with hypoxia (HCC)    Goals of care, counseling/discussion    Encounter for palliative care    Acute encephalopathy    Acute blood loss anemia    History of CVA with residual deficit    History of creation of ventriculoperitoneal shunt    Debility    Septic shock due to urinary tract infection (HCC)       Septic shock (HCC) [A41.9, R65.21]    Reason patient intubated:RESPIRATORY FAILURE, PNEUMONIA    Ventilator day:4    Ventilator settings:AC/VC+ 16/500/+5/28%    ETT Size/Placement:7.5 23@LIP     Wean Screen Pass (Yes or No):YES  Wean Screen Reason for Failure:  Duration of Weaning Trial:7.5 HRS  Additional Comments:        PLAN OF CARE:       GOAL:      OUTCOME:     ABG:  Date:6/17/2024  @[id number](ph,phi,pco2,pco2i,po2,po2i,hco3,hco3i,fio2,fio2i)@    Chest X-ray:  Date:6/17/2024  @CRYS(SQE3008:1)@    Lab Test:  Date:6/17/2024  WBC:

## 2024-06-17 NOTE — PROGRESS NOTES
06/14/2024    INR 1.4 (H) 06/13/2024    PROTIME 16.7 (H) 06/14/2024    PROTIME 17.4 (H) 06/13/2024      Results       Procedure Component Value Units Date/Time    Culture, MRSA, Screening [[id number]] Collected: 06/13/24 1400    Order Status: Completed Specimen: Nares Updated: 06/14/24 2320     Special Requests NO SPECIAL REQUESTS        Culture MRSA NOT PRESENT               Screening of patient nares for MRSA is for surveillance purposes and, if positive, to facilitate isolation considerations in high risk settings. It is not intended for automatic decolonization interventions per se as regimens are not sufficiently effective to warrant routine use.      Culture, Urine [[id number]]  (Abnormal)  (Susceptibility) Collected: 06/12/24 1900    Order Status: Completed Specimen: Urine, clean catch Updated: 06/16/24 0928     Special Requests NO SPECIAL REQUESTS        [place] count --        >100,000  COLONIES/mL       Culture Klebsiella pneumoniae       Susceptibility        Klebsiella pneumoniae      BACTERIAL SUSCEPTIBILITY PANEL ROGER      amikacin <=2 ug/mL Sensitive      ampicillin 16 ug/mL Resistant      ampicillin-sulbactam <=2 ug/mL Sensitive      ceFAZolin <=4 ug/mL Sensitive      cefepime <=1 ug/mL Sensitive      cefOXitin <=4 ug/mL Sensitive      cefTAZidime <=1 ug/mL Sensitive      cefTRIAXone <=1 ug/mL Sensitive      ciprofloxacin <=0.25 ug/mL Sensitive      gentamicin <=1 ug/mL Sensitive      levofloxacin <=0.12 ug/mL Sensitive      meropenem <=0.25 ug/mL Sensitive      nitrofurantoin <=16 ug/mL Sensitive      piperacillin-tazobactam <=4 ug/mL Sensitive      tobramycin <=1 ug/mL Sensitive      trimethoprim-sulfamethoxazole <=20 ug/mL Sensitive                           COVID-19 & Influenza Combo [[id number]] Collected: 06/12/24 1300    Order Status: Completed Specimen: Nasopharyngeal Updated: 06/12/24 1341     SARS-CoV-2, PCR Not detected        Comment: Not Detected results do not preclude SARS-CoV-2  infection and should not be used as the sole basis for patient management decisions.Results must be combined with clinical observations, patient history, and epidemiological information.        Rapid Influenza A By PCR Not detected        Rapid Influenza B By PCR Not detected        Comment: Testing was performed using robin Shraddha SARS-CoV-2 and Influenza A/B nucleic acid assay.  This test is a multiplex Real-Time Reverse Transcriptase Polymerase Chain Reaction (RT-PCR) based in vitro diagnostic test intended for the qualitative detection of nucleic acids from SARS-CoV-2, Influenza A, and Influenza B in nasopharyngeal for use under the FDA's Emergency Use Authorization(EAU) only.     Fact sheet for Patients: https://www.fda.gov/media/843720/download  Fact sheet for Healthcare Providers: https://www.fda.fov/media/635732/download         Blood Culture 2 [[id number]] Collected: 06/12/24 1200    Order Status: Completed Specimen: Blood Updated: 06/16/24 0645     Special Requests NO SPECIAL REQUESTS        Culture NO GROWTH 4 DAYS       Blood Culture 1 [[id number]] Collected: 06/12/24 1150    Order Status: Completed Specimen: Blood Updated: 06/16/24 0645     Special Requests NO SPECIAL REQUESTS        Culture NO GROWTH 4 DAYS                    Imaging:  [x]   I have personally reviewed the patient’s radiographs and reports  Most recent imaging:  XR CHEST PORTABLE    Result Date: 6/16/2024  1.  Improved aeration of the lung bases. Probable very small LEFT pleural effusion. 2.  Support devices as above. 3.  Somewhat limited exam.  Electronically signed by Norma Matias    XR CHEST PORTABLE    Result Date: 6/15/2024  1.  Tubes and lines as described above. 2. Limited study. The patient's hand obscures the right lung base. Decreasing lung volumes with increasing bibasilar atelectasis. Electronically signed by Barry Austin    XR ABDOMEN (KUB) (SINGLE AP VIEW)    Result Date: 6/15/2024  Bowel gas pattern most consistent

## 2024-06-17 NOTE — CARE COORDINATION
06/17/24 0854   Service Assessment   Patient Orientation Other (see comment)  (On vent)   Cognition Alert   History Provided By Child/Family  (Jade Vasquez- Daughter)   Primary Caregiver Other (Comment)  (Resides at a facility-[facility])   Prior Functional Level Assistance with the following:;Bathing;Dressing;Mobility   Current Functional Level Assistance with the following:;Bathing;Dressing;Mobility   Can patient return to prior living arrangement Yes   Would you like for me to discuss the discharge plan with any other family members/significant others, and if so, who? Yes  (Arthur Smiley- Daughter)   Financial Resources Medicare;Medicaid   Community Resources None   Social/Functional History   Lives With Other (comment)  ([facility])   Type of Home Assisted living   Home Layout Multi-level   Home Access Elevator   Bathroom Shower/Tub Walk-in shower   Bathroom Toilet Handicap height   Bathroom Accessibility Wheelchair accessible   Home Equipment Wheelchair - Manual   ADL Assistance Needs assistance   Toileting Needs assistance   Homemaking Assistance Needs assistance   Mode of Transportation   (Facility will transport)   Occupation On disability;Retired   Discharge Planning   Type of Residence Other (Comment)  ([facility])   Living Arrangements Other (Comment)  ([facility])   Potential Assistance Needed N/A   DME Ordered? No   Potential Assistance Purchasing Medications Yes  ([facility])   Type of Home Care Services None   Services At/After Discharge   [place] Resource Information Provided? No   Mode of Transport at Discharge BLS   Confirm Follow Up Transport Family   Condition of Participation: Discharge Planning   The Plan for Transition of Care is related to the following treatment goals: Pt dispo home pending medical recs.     ALLAN Rodríguez

## 2024-06-17 NOTE — PROGRESS NOTES
[email or web address]  [phone]    Gastroenterology follow up-Progress note    Impression:  1. Septic Shock - complicated UTI w/ hx chronic indwelling friedman  - recent hospitalization @[facility] last month for same  2. Acute on chronic anemia - large high risk gastric ulcer noted on EGD 6/14  -EGD 6/14/2024-large amount of blood in stomach-fundus could not be cleared; 2 cm deep proximal duodenal bulb ulcer with overlying clot and high risk stigmata-not amenable to endoscopic therapy.  - past history of PUD w/ EGD 11/2022 w/ non-bleeding gastric ulcer w/ no signs of recent bleed, duodenitis, gastric bx: reactive gastropathy gastric ulcer: focally denuded mucosa with chronic inflammation  3. Hx CVA w/ chronic R sided deficits, s/p  Shunt 2014  4. Acute metabolic/toxic encephalopathy  5. LORETTA on CKD3    Plan:  1. Continue PPI: okay for high dose bid dosing vs drip  2. If there are signs of active ongoing bleeding, recommend IR embolization of Gastroduodenal artery. High risk candidate for laparotomy, but may have to consider that as a last resort. Appreciate Dr. Avila's inputs.   3. Monitor H/H, transfuse per protocol  4. Continue medical management per primary team   5. Clear liquids should be okay once pt is extubated and passes swallow study    Chief Complaint: acute anemia    Subjective:  Pt seen in ICU; appears hemodynamically stable, pt undergoing sedation vacation and SBT, able to participate in interview with thumbs up/down to answer questions. Indicates some ABD pain, unable to quantify/qualify, otherwise feeling pretty good (thumbs up).    ROS: mild ABD discomfort    General: Alert, intubated, RUE contracture, participated in conversation with thumbs up/down  Eyes: sclera anicteric  Cardiovascular: not tachycardic  Pulmonary: SBT on vent  Abdominal: appearance normal; NG in place, soft, mild tenderness, maybe RUQ to light palpation  Neuro: Alert, participated in conversation with thumbs up/down    Patient  Active Problem List   Diagnosis    Urine retention    Urinary tract infection without hematuria    Acute cystitis without hematuria    Acute respiratory failure with hypoxemia (HCC)    LORETTA (acute kidney injury) (HCC)    Anemia    Backache    BPH (benign prostatic hyperplasia)    Chronic neck pain    Constipation    Dementia (HCC)    Environmental and seasonal allergies    Essential hypertension    Gastroesophageal reflux disease without esophagitis    Hemiparesis affecting right side as late effect of stroke (HCC)    High anion gap metabolic acidosis    Hydrocephalus, communicating (HCC)    Neutrophilic leukocytosis    Other acute kidney failure (HCC)    Other hyperlipidemia    Prediabetes    Recurrent major depressive disorder, in partial remission (HCC)    Septic shock (HCC)    Sleep difficulties    Vitamin D deficiency    Hemorrhagic shock (HCC)    Acute respiratory failure with hypoxia (HCC)    Goals of care, counseling/discussion    Encounter for palliative care    Acute encephalopathy    Acute blood loss anemia    History of CVA with residual deficit    History of creation of ventriculoperitoneal shunt    Debility    Septic shock due to urinary tract infection (HCC)         BP (!) 118/97   Pulse 71   Temp 99 °F (37.2 °C) (Axillary) Comment: encouraged to cough /deep breath. instructed to use  is  Resp 16   Ht 1.778 m (5' 10\")   Wt 77.6 kg (171 lb)   SpO2 100%   BMI 24.54 kg/m²         Intake/Output Summary (Last 24 hours) at 6/17/2024 1112  Last data filed at 6/17/2024 0800  Gross per 24 hour   Intake 1915.84 ml   Output 2715 ml   Net -799.16 ml         CBC w/Diff    Lab Results   Component Value Date/Time    WBC 13.2 06/17/2024 03:04 AM    RBC 2.62 (L) 06/17/2024 03:04 AM    HGB 7.6 (L) 06/17/2024 03:04 AM    HCT 23.1 (L) 06/17/2024 03:04 AM    MCV 88.2 06/17/2024 03:04 AM    MCH 29.0 06/17/2024 03:04 AM    MCHC 32.9 06/17/2024 03:04 AM    RDW 16.4 (H) 06/17/2024 03:04 AM     (L) 06/17/2024

## 2024-06-17 NOTE — PROGRESS NOTES
General Surgery Consult    Alycia Clement  Admit date: 2024    MRN: [id number]     : 1955     Age: 69 y.o.        Attending Physician: Sang Avila MD [facility]      History of Present Illness:      Alycia Clement is a 69 y.o. male who we are following for evaluation of upper GI bleed in the setting of multiple comorbidities.  Overnight the patient continued to do well with no evidence of any active bleeding.  Intubated in the ICU with normal vital signs and stable hemoglobin and hematocrit.     Patient Active Problem List    Diagnosis Date Noted    Hemorrhagic shock (HCC) 2024    Acute respiratory failure with hypoxia ([facility]) 2024    Goals of care, counseling/discussion 2024    Encounter for palliative care 2024    Acute encephalopathy 2024    Acute blood loss anemia 2024    History of CVA with residual deficit 2024    History of creation of ventriculoperitoneal shunt 2024    Debility 2024    Septic shock due to urinary tract infection ([facility]) 2024    LORETTA (acute kidney injury) ([facility]) 2022    High anion gap metabolic acidosis 2022    Acute respiratory failure with hypoxemia ([facility]) 2022    Neutrophilic leukocytosis 2022    Other acute kidney failure ([facility]) 2022    Septic shock ([facility]) 2022    Hemiparesis affecting right side as late effect of stroke ([facility]) 2022    Recurrent major depressive disorder, in partial remission ([facility]) 2022    BPH (benign prostatic hyperplasia) 2022    Other hyperlipidemia 2022    Urinary tract infection without hematuria 2020    Prediabetes 2019    Chronic neck pain 2019    Constipation 2019    Environmental and seasonal allergies 2019    Urine retention 2019    Sleep difficulties 2018    Acute cystitis without hematuria 2018    Backache 2017    Vitamin D deficiency 2016    Anemia 2015    Dementia ([facility])

## 2024-06-17 NOTE — INTERDISCIPLINARY ROUNDS
[facility]  Pulmonary, Critical Care, and Sleep Medicine  Interdisciplinary and Ventilator Weaning Rounds    Patient discussed in morning walking rounds and interdisciplinary rounds.    ICU admission day: 6/12    Overnight events:   No acute overnight events    Assessments and best practice:  Ventilator  Vent Settings  FiO2 : 28 %  Resp Rate (Set): 16 bpm  PEEP/CPAP (cmH2O): 5  Insp Time (sec): 0.8 sec  Insp Rise Time (%): 50 %  Pressure Support (cm H2O): 12 cm H2O  Flow Sensitivity: 3 L/min  Disconnect Sensitivity (%): 75 %  Expiratory Sensitivity (%): 25 %  Glycemic control  Diet  Diet NPO  Stress ulcer prophylaxis.  Protonix  DVT prophylaxis.  Not indicated 2/2 anemia  Need for Lines, friedman assessed.  Yes  Restraint Reevaluation   N/A  Disposition regarding transferring out of the ICU  RED      Family contact/MPOA: Jade Vasquez, daughter  Family updated  yesterday at bedside    Palliative consult within 3 days of admission to ICU- yes  Ethics Guidance: 21 days      Daily Plans:  -SBT  - Replacing lytes  BHUPINDER time 10 minutes        RONI Goode - NP   Pulmonary, Critical Care Medicine  [facility]

## 2024-06-17 NOTE — PROGRESS NOTES
Patient sseen and examined:    1) LORETTA on CKD3a  baseline creat 1.1-1.5  2) Etiology prerenal azotemia -> improving   3) Hypernatremia -improving with d5w  4) Hypokalemia , replace aggressively   5) Metabolic acidoses improving  6) Bradycardia      Plan:  1) d5w + 20 meq of Kcl @ 75 cc/hrs   2) monitor electrolytes and renal functions  daily  3) avoid NSAIds , IV contrast  4) renally dose AB for egfr < 30     Please call with questions     Santana Earl MD FASN  Cell [phone]  Pager: [phone]  Fax   [phone]

## 2024-06-17 NOTE — CONSULTS
Comprehensive Nutrition Assessment    Type and Reason for Visit:  Reassess, Consult, NPO/Clear Liquid    Nutrition Recommendations/Plan:   Initiate tube feeding regimen:   Formula: Vital AF 1.2 (Peptide Based)  Start at 15 ml/hr  Advance as tolerated by 10 ml q 8 hours  Goal Rate: 75 ml/hr x 20 hours (for anticipation of holding tube feeds for standard nursing care)  Water Flushes: 100 mL q 4 hours (600 mL total) or per MD  Goal Regimen Provides (with modulars):  1800 kcal, 113g protein, 1815 ml free water, 100% RDIs  D5 per MD.   Daily wts.   Continue to monitor tolerance of EN, weight, labs, and plan of care during admission.      Malnutrition Assessment:  Malnutrition Status:  Mild malnutrition (06/17/24 0956)    Context:  Acute Illness     Findings of the 6 clinical characteristics of malnutrition:  Energy Intake:  50% or less of estimated energy requirements for 5 or more days  Weight Loss:  No significant weight loss     Body Fat Loss:  No significant body fat loss Orbital, Buccal region   Muscle Mass Loss:  Mild muscle mass loss Temples (temporalis)  Fluid Accumulation:  Unable to assess     Strength:  Not Performed    Nutrition Assessment:    Admitted from NH for AMS.On 6/13, pt decompensated, emergently intubated. Nephrology following, LORETTA on CKD. Pt has been NPO since admit x 5 days due to GIB 2/2 large duodenal ulcer. Per surgery 6/17, consider starting tube feed and advance as tolerated. Bed scale taken this date 75.1 kg. Visual NFPE conducted. Discussed care during interdisciplinary rounds. Per RN, pt receiving lytes replacements, on SBT, plan for possible extubation. Initiate TF if not extubated. TF consult placed.    Nutrition Related Findings:    Pertinent Meds: 2g Mg sulf, D5 @ 75 ml/hr (90g dex, 306 kcal), dopamine (held), protonix  Pertinent Labs: Na 150 H (trending down), K 3.1 L (trending down), Phos 3.4 wnl (trending up), BUN/Cr 47/1.6, GFR 47   Last BM: 06/16/24  Skin: Wound Type: None   88

## 2024-06-17 NOTE — PROGRESS NOTES
VENTILATOR CARE PLAN    Problem: Ventilator Management  Goal: *Adequate oxygenation/ ventilation/ and extubation      Patient:        Alycia Clement     69 y.o.   male     6/17/2024  2:32 PM  Patient Active Problem List   Diagnosis    Urine retention    Urinary tract infection associated with indwelling urethral catheter (HCC)    Acute cystitis without hematuria    Acute respiratory failure with hypoxemia (HCC)    LORETTA (acute kidney injury) (HCC)    Anemia    Backache    BPH (benign prostatic hyperplasia)    Chronic neck pain    Constipation    Dementia (HCC)    Environmental and seasonal allergies    Essential hypertension    Gastroesophageal reflux disease without esophagitis    Hemiparesis affecting right side as late effect of stroke (HCC)    High anion gap metabolic acidosis    Hydrocephalus, communicating (HCC)    Neutrophilic leukocytosis    Other acute kidney failure (HCC)    Other hyperlipidemia    Prediabetes    Recurrent major depressive disorder, in partial remission (HCC)    Septic shock (HCC)    Sleep difficulties    Vitamin D deficiency    Hemorrhagic shock (HCC)    Acute respiratory failure with hypoxia (HCC)    Goals of care, counseling/discussion    Encounter for palliative care    Acute encephalopathy    Acute blood loss anemia    History of CVA with residual deficit    History of creation of ventriculoperitoneal shunt    Debility    Septic shock due to urinary tract infection (HCC)       Septic shock (HCC) [A41.9, R65.21]    Reason patient intubated:  Airway protection secondary to septic shock with AMS.      Ventilator settings: CPAP PS 5, PEEP 5 28%     ETT Size/Placement: 7.5 ETT 25cm Lip     Wean Screen Pass (Yes or No): YES  Wean Screen Reason for Failure: 0  Duration of Weaning Trial: Started at 0705 on CPAP PS 12 and titrated down PS 5, PEEP 5 28%.   Additional Comments: At 1430 order to extubate patient         PLAN OF CARE: Wean protocol       GOAL: Extubate       OUTCOME: Extubated at 1440

## 2024-06-18 PROBLEM — Z86.73 HISTORY OF CVA (CEREBROVASCULAR ACCIDENT): Status: ACTIVE | Noted: 2024-06-18

## 2024-06-18 LAB
ANION GAP SERPL CALC-SCNC: 4 MMOL/L (ref 3–18)
BACTERIA SPEC CULT: NORMAL
BACTERIA SPEC CULT: NORMAL
BASOPHILS # BLD: 0 K/UL (ref 0–0.1)
BASOPHILS NFR BLD: 0 % (ref 0–2)
BUN SERPL-MCNC: 30 MG/DL (ref 7–18)
BUN/CREAT SERPL: 20 (ref 12–20)
CALCIUM SERPL-MCNC: 7.7 MG/DL (ref 8.5–10.1)
CHLORIDE SERPL-SCNC: 120 MMOL/L (ref 100–111)
CO2 SERPL-SCNC: 21 MMOL/L (ref 21–32)
CREAT SERPL-MCNC: 1.51 MG/DL (ref 0.6–1.3)
DIFFERENTIAL METHOD BLD: ABNORMAL
EOSINOPHIL # BLD: 0.5 K/UL (ref 0–0.4)
EOSINOPHIL NFR BLD: 4 % (ref 0–5)
ERYTHROCYTE [DISTWIDTH] IN BLOOD BY AUTOMATED COUNT: 16.3 % (ref 11.6–14.5)
GLUCOSE BLD STRIP.AUTO-MCNC: 105 MG/DL (ref 70–110)
GLUCOSE BLD STRIP.AUTO-MCNC: 107 MG/DL (ref 70–110)
GLUCOSE BLD STRIP.AUTO-MCNC: 78 MG/DL (ref 70–110)
GLUCOSE BLD STRIP.AUTO-MCNC: 95 MG/DL (ref 70–110)
GLUCOSE SERPL-MCNC: 122 MG/DL (ref 74–99)
HCT VFR BLD AUTO: 21.2 % (ref 36–48)
HCT VFR BLD AUTO: 25.7 % (ref 36–48)
HCT VFR BLD AUTO: 27.3 % (ref 36–48)
HGB BLD-MCNC: 6.8 G/DL (ref 13–16)
HGB BLD-MCNC: 8.4 G/DL (ref 13–16)
HGB BLD-MCNC: 8.6 G/DL (ref 13–16)
HISTORY CHECK: NORMAL
IMM GRANULOCYTES # BLD AUTO: 0.2 K/UL (ref 0–0.04)
IMM GRANULOCYTES NFR BLD AUTO: 2 % (ref 0–0.5)
LYMPHOCYTES # BLD: 2.2 K/UL (ref 0.9–3.6)
LYMPHOCYTES NFR BLD: 19 % (ref 21–52)
MAGNESIUM SERPL-MCNC: 2.5 MG/DL (ref 1.6–2.6)
MCH RBC QN AUTO: 29.1 PG (ref 24–34)
MCHC RBC AUTO-ENTMCNC: 32.1 G/DL (ref 31–37)
MCV RBC AUTO: 90.6 FL (ref 78–100)
MONOCYTES # BLD: 0.9 K/UL (ref 0.05–1.2)
MONOCYTES NFR BLD: 7 % (ref 3–10)
NEUTS SEG # BLD: 7.8 K/UL (ref 1.8–8)
NEUTS SEG NFR BLD: 67 % (ref 40–73)
NRBC # BLD: 0.02 K/UL (ref 0–0.01)
NRBC BLD-RTO: 0.2 PER 100 WBC
PHOSPHATE SERPL-MCNC: 2.7 MG/DL (ref 2.5–4.9)
PLATELET # BLD AUTO: 124 K/UL (ref 135–420)
PMV BLD AUTO: 11 FL (ref 9.2–11.8)
POTASSIUM SERPL-SCNC: 3.7 MMOL/L (ref 3.5–5.5)
RBC # BLD AUTO: 2.34 M/UL (ref 4.35–5.65)
SERVICE CMNT-IMP: NORMAL
SERVICE CMNT-IMP: NORMAL
SODIUM SERPL-SCNC: 145 MMOL/L (ref 136–145)
WBC # BLD AUTO: 11.6 K/UL (ref 4.6–13.2)

## 2024-06-18 PROCEDURE — 84100 ASSAY OF PHOSPHORUS: CPT

## 2024-06-18 PROCEDURE — 92610 EVALUATE SWALLOWING FUNCTION: CPT

## 2024-06-18 PROCEDURE — 97110 THERAPEUTIC EXERCISES: CPT

## 2024-06-18 PROCEDURE — C9113 INJ PANTOPRAZOLE SODIUM, VIA: HCPCS | Performed by: INTERNAL MEDICINE

## 2024-06-18 PROCEDURE — 99291 CRITICAL CARE FIRST HOUR: CPT | Performed by: INTERNAL MEDICINE

## 2024-06-18 PROCEDURE — 97165 OT EVAL LOW COMPLEX 30 MIN: CPT

## 2024-06-18 PROCEDURE — 2580000003 HC RX 258: Performed by: INTERNAL MEDICINE

## 2024-06-18 PROCEDURE — 2580000003 HC RX 258: Performed by: PHYSICIAN ASSISTANT

## 2024-06-18 PROCEDURE — 97162 PT EVAL MOD COMPLEX 30 MIN: CPT

## 2024-06-18 PROCEDURE — 36430 TRANSFUSION BLD/BLD COMPNT: CPT

## 2024-06-18 PROCEDURE — 85018 HEMOGLOBIN: CPT

## 2024-06-18 PROCEDURE — 6360000002 HC RX W HCPCS: Performed by: STUDENT IN AN ORGANIZED HEALTH CARE EDUCATION/TRAINING PROGRAM

## 2024-06-18 PROCEDURE — 6360000002 HC RX W HCPCS: Performed by: INTERNAL MEDICINE

## 2024-06-18 PROCEDURE — 94761 N-INVAS EAR/PLS OXIMETRY MLT: CPT

## 2024-06-18 PROCEDURE — C9113 INJ PANTOPRAZOLE SODIUM, VIA: HCPCS | Performed by: STUDENT IN AN ORGANIZED HEALTH CARE EDUCATION/TRAINING PROGRAM

## 2024-06-18 PROCEDURE — 2580000003 HC RX 258: Performed by: STUDENT IN AN ORGANIZED HEALTH CARE EDUCATION/TRAINING PROGRAM

## 2024-06-18 PROCEDURE — APPSS30 APP SPLIT SHARED TIME 16-30 MINUTES: Performed by: REGISTERED NURSE

## 2024-06-18 PROCEDURE — 97535 SELF CARE MNGMENT TRAINING: CPT

## 2024-06-18 PROCEDURE — 80048 BASIC METABOLIC PNL TOTAL CA: CPT

## 2024-06-18 PROCEDURE — P9016 RBC LEUKOCYTES REDUCED: HCPCS

## 2024-06-18 PROCEDURE — 85025 COMPLETE CBC W/AUTO DIFF WBC: CPT

## 2024-06-18 PROCEDURE — 99232 SBSQ HOSP IP/OBS MODERATE 35: CPT | Performed by: NURSE PRACTITIONER

## 2024-06-18 PROCEDURE — 2140000001 HC CVICU INTERMEDIATE R&B

## 2024-06-18 PROCEDURE — 99233 SBSQ HOSP IP/OBS HIGH 50: CPT | Performed by: SURGERY

## 2024-06-18 PROCEDURE — 82962 GLUCOSE BLOOD TEST: CPT

## 2024-06-18 PROCEDURE — 92526 ORAL FUNCTION THERAPY: CPT

## 2024-06-18 PROCEDURE — 83735 ASSAY OF MAGNESIUM: CPT

## 2024-06-18 PROCEDURE — 85014 HEMATOCRIT: CPT

## 2024-06-18 PROCEDURE — 36415 COLL VENOUS BLD VENIPUNCTURE: CPT

## 2024-06-18 RX ORDER — SODIUM CHLORIDE 9 MG/ML
INJECTION, SOLUTION INTRAVENOUS PRN
Status: DISCONTINUED | OUTPATIENT
Start: 2024-06-18 | End: 2024-06-21 | Stop reason: HOSPADM

## 2024-06-18 RX ORDER — POTASSIUM CHLORIDE, DEXTROSE MONOHYDRATE 150; 5 MG/100ML; G/100ML
INJECTION, SOLUTION INTRAVENOUS CONTINUOUS
Status: DISCONTINUED | OUTPATIENT
Start: 2024-06-18 | End: 2024-06-19

## 2024-06-18 RX ADMIN — PIPERACILLIN AND TAZOBACTAM 3375 MG: 3; .375 INJECTION, POWDER, FOR SOLUTION INTRAVENOUS at 21:56

## 2024-06-18 RX ADMIN — PANTOPRAZOLE SODIUM 8 MG/HR: 40 INJECTION, POWDER, FOR SOLUTION INTRAVENOUS at 03:24

## 2024-06-18 RX ADMIN — PANTOPRAZOLE SODIUM 8 MG/HR: 40 INJECTION, POWDER, FOR SOLUTION INTRAVENOUS at 09:05

## 2024-06-18 RX ADMIN — PIPERACILLIN AND TAZOBACTAM 3375 MG: 3; .375 INJECTION, POWDER, FOR SOLUTION INTRAVENOUS at 12:02

## 2024-06-18 RX ADMIN — PIPERACILLIN AND TAZOBACTAM 3375 MG: 3; .375 INJECTION, POWDER, FOR SOLUTION INTRAVENOUS at 00:04

## 2024-06-18 RX ADMIN — POTASSIUM CHLORIDE AND DEXTROSE MONOHYDRATE: 150; 5 INJECTION, SOLUTION INTRAVENOUS at 01:34

## 2024-06-18 RX ADMIN — SODIUM CHLORIDE, PRESERVATIVE FREE 10 ML: 5 INJECTION INTRAVENOUS at 20:52

## 2024-06-18 RX ADMIN — POTASSIUM CHLORIDE AND DEXTROSE MONOHYDRATE: 150; 5 INJECTION, SOLUTION INTRAVENOUS at 18:02

## 2024-06-18 RX ADMIN — PANTOPRAZOLE SODIUM 40 MG: 40 INJECTION, POWDER, FOR SOLUTION INTRAVENOUS at 20:50

## 2024-06-18 RX ADMIN — SODIUM CHLORIDE, PRESERVATIVE FREE 10 ML: 5 INJECTION INTRAVENOUS at 09:06

## 2024-06-18 ASSESSMENT — PAIN SCALES - GENERAL
PAINLEVEL_OUTOF10: 0

## 2024-06-18 NOTE — PROGRESS NOTES
Physician Progress Note      PATIENT:               INGE MINAYA  [facility] #:                  [id number]  :                       1955  ADMIT DATE:       2024 11:01 AM  DISCH DATE:  RESPONDING  PROVIDER #:        Kamla Joseph MD          QUERY TEXT:    Dear attending physician,    Pt admitted with UTI.  Pt noted to have a chronic indwelling urinary catheter   that was changed in the ED.    If possible, please document in the progress notes and discharge summary if   you are evaluating and/or treating any of the following:    The medical record reflects the following:  Risk Factors: chronic indwelling urinary catheter PTA    Clinical Indicators: -UA-Bacteria +4, LE large  Per H&P- Complicated UTI- recent discharge from [facility] for septic shock   related to UTI. Chronic indwelling Friedman for urinary retention. Multiple   admission for UTI.  -Per GI- Septic Shock - complicated UTI w/ hx chronic indwelling friedman    Treatment: IVABX, Monitor vital signs, labwork, imaging    Thank you    Harper Santoro RN, BSN, CRCR, CCDS  Clinical Documentation Improvement  [facility] phone # [phone] or via [employer]  Options provided:  -- UTI due to chronic indwelling urinary catheter PTA  -- UTI not due to indwelling urinary catheter PTA  -- Other - I will add my own diagnosis  -- Disagree - Not applicable / Not valid  -- Disagree - Clinically unable to determine / Unknown  -- Refer to Clinical Documentation Reviewer    PROVIDER RESPONSE TEXT:    UTI is due to the chronic indwelling urinary catheter PTA.    Query created by: Harper Santoro on 2024 8:03 AM      Electronically signed by:  Kamla Joseph MD 2024 9:57 AM

## 2024-06-18 NOTE — PROGRESS NOTES
Occupational Therapy    OCCUPATIONAL THERAPY EVALUATION/DISCHARGE    Patient: Alycia Clement (69 y.o. male)  Date: 6/18/2024  Primary Diagnosis: Septic shock (HCC) [A41.9, R65.21]  Procedure(s) (LRB):  ESOPHAGOGASTRODUODENOSCOPY (N/A) 4 Days Post-Op   Precautions: Fall Risk, General Precautions,     PLOF: Dependent using electric w/c for mobility. Transfers via jim lift. Assist w/ ADLs. Lives in a senior living facility.    ASSESSMENT AND RECOMMENDATIONS:  Pt agreeable to OT evaluation. Pt is a poor historian, and somewhat confused. Pt takes extra time to answer questions. RUE is contracted and has no AROM, also no AROM noted RLE. Pt is max A to roll L>< R in bed. Pt appears to be at baseline level of function. Pt left semi supine, all needs in reach.    Maximum therapeutic gains met at current level of care and patient will be discharged from occupational therapy at this time.    Further Equipment Recommendations for Discharge: none    AMPA: AM-PAC Inpatient Daily Activity Raw Score: 11    Current research shows that an AM-PAC score of 17 or less is not associated with a discharge to the patient's home setting but patient at baseline level of function or has support for discharge to prior setting.    This [facility] score should be considered in conjunction with interdisciplinary team recommendations to determine the most appropriate discharge setting. Patient's social support, diagnosis, medical stability, and prior level of function should also be taken into consideration.     SUBJECTIVE:   Patient stated “It is may.”    OBJECTIVE DATA SUMMARY:     Past Medical History:   Diagnosis Date    LORETTA (acute kidney injury) ([facility])     Back pain     Bimalleolar fracture of right ankle 07/25/2018    CKD (chronic kidney disease)     Closed fracture of right ankle 11/26/2018    Essential hypertension 12/11/2015    Gastroesophageal reflux disease without esophagitis 12/11/2015    History of hemorrhagic stroke with residual

## 2024-06-18 NOTE — PROGRESS NOTES
PHYSICAL THERAPY EVALUATION/DISCHARGE    Patient: Alycia Clement (69 y.o. male)  Date: 6/18/2024  Primary Diagnosis: Septic shock ([facility]) [A41.9, R65.21]  Procedure(s) (LRB):  ESOPHAGOGASTRODUODENOSCOPY (N/A) 4 Days Post-Op   Precautions: Fall Risk, General Precautions,   PLOF: Dependent using electric w/c for mobility. Transfers via jim lift. Lives in nursing facility with no previous therapy.    ASSESSMENT AND RECOMMENDATIONS:  Patient received in bed and agreed to PT. Pt minimally verbal and uses hand gestures for responding to questions. RLE no AROM and nonfunctional PROM, extensor tone. LLE decreased AROM, full PROM. MaxA to roll and scoot for repositioning in bed. Pt left in semisupine with heel floaters and wedges on L side. Educated on need for RN assistance with mobility. Call bell in reach. Pt is at baseline for functional mobility. Patient does not require further skilled physical therapy intervention at this level of care.    Further Equipment Recommendations for Discharge:  Pt already has DME needed in home environment.     AM-PAC Inpatient Mobility Raw Score : 6    This [facility] score should be considered in conjunction with interdisciplinary team recommendations to determine the most appropriate discharge setting. Patient's social support, diagnosis, medical stability, and prior level of function should also be taken into consideration.    At this time and based on an AM-PAC score, no further PT is recommended upon discharge.  Recommend patient returns to prior setting with prior services.     SUBJECTIVE:   Patient stated “I move it.”    OBJECTIVE DATA SUMMARY:     Past Medical History:   Diagnosis Date    LORETTA (acute kidney injury) ([facility])     Back pain     Bimalleolar fracture of right ankle 07/25/2018    CKD (chronic kidney disease)     Closed fracture of right ankle 11/26/2018    Essential hypertension 12/11/2015    Gastroesophageal reflux disease without esophagitis 12/11/2015    History of hemorrhagic  stroke with residual hemiparesis (HCC) 09/21/2014    Non-adherence to medical treatment     Non-adherence to medical treatment     Potassium (K) deficiency 11/26/2018    Sepsis ([facility])     Urinary retention     Urinary tract infection without hematuria     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     Past Surgical History:   Procedure Laterality Date    OTHER SURGICAL HISTORY  10/09/2014    INSERTION CPT: 67018;  Surgeon: Alycia Dumas MD;  Location: [facility] MAIN OR [facility];  Service: Neurosurgery;  Laterality: Right;  VENTRICULOPERITONEAL SHUNT INSERTION    OTHER SURGICAL HISTORY      Pr place percut gastrostomy    OTHER SURGICAL HISTORY Right 10/09/2014    Ventriculoperitoneal Shunt Insertion -- Dr Alycia Dumas ([facility])    OTHER SURGICAL HISTORY N/A 10/16/2014    Endoscopic Percutaneous Gastronomy Tube insertion -- Dr Juan Antonio Thompson ([facility])    UPPER GASTROINTESTINAL ENDOSCOPY N/A 6/14/2024    ESOPHAGOGASTRODUODENOSCOPY performed by Jade Witt MD at [facility] ENDOSCOPY       Home Situation:  Social/Functional History  Lives With: Facility staff  Type of Home: Facility   Home Equipment: Wheelchair - Electric, Mechanical lift  ADL Assistance: Needs assistance  Toileting: Needs assistance  Homemaking Assistance: Needs assistance    Critical Behavior:  Orientation  Orientation Level: Oriented to person;Oriented to place    BLE Strength:    Strength: Grossly decreased, non-functional    BLE Range Of Motion:   AROM: Grossly decreased, non-functional  PROM: Grossly decreased, non-functional    Functional Mobility:  Bed Mobility:  Bed Mobility Training  Bed Mobility Training: Yes  Rolling: Maximum assistance  Scooting: Maximum assistance  Transfers:  Transfer Training  Transfer Training: No  Ambulation/Gait Training:  Gait  Gait Training: No      Pain:  Intensity treatment: min verbal-moans in pain with mobility  Scale: Numeric Rating Scale  Location: Right Leg  Intervention(s):

## 2024-06-18 NOTE — PROGRESS NOTES
General Surgery Consult    Alycia Clement  Admit date: 2024    MRN: [id number]     : 1955     Age: 69 y.o.        Attending Physician: Sang Avila MD [facility]      History of Present Illness:      Alycia Clement is a 69 y.o. male who we are following for evaluation of upper GI bleed secondary to duodenal ulcer.  The patient was extubated yesterday.  There is no evidence of any active bleeding.  He has no fever or tachycardia and his hemoglobin and hematocrit are relatively stable.     Patient Active Problem List    Diagnosis Date Noted    Hemorrhagic shock (HCC) 2024    Acute respiratory failure with hypoxia ([facility]) 2024    Goals of care, counseling/discussion 2024    Encounter for palliative care 2024    Acute encephalopathy 2024    Acute blood loss anemia 2024    History of CVA with residual deficit 2024    History of creation of ventriculoperitoneal shunt 2024    Debility 2024    Septic shock due to urinary tract infection ([facility]) 2024    LORETTA (acute kidney injury) ([facility]) 2022    High anion gap metabolic acidosis 2022    Acute respiratory failure with hypoxemia ([facility]) 2022    Neutrophilic leukocytosis 2022    Other acute kidney failure ([facility]) 2022    Septic shock ([facility]) 2022    Hemiparesis affecting right side as late effect of stroke ([facility]) 2022    Recurrent major depressive disorder, in partial remission ([facility]) 2022    BPH (benign prostatic hyperplasia) 2022    Other hyperlipidemia 2022    Urinary tract infection associated with indwelling urethral catheter ([facility]) 2020    Prediabetes 2019    Chronic neck pain 2019    Constipation 2019    Environmental and seasonal allergies 2019    Urine retention 2019    Sleep difficulties 2018    Acute cystitis without hematuria 2018    Backache 2017    Vitamin D deficiency 2016    Anemia 2015  ISSUED     Unit Issue Date/Time [id number]     Blood Bank Unit Type and Rh O POS     Blood Bank ISBT Product Blood Type 5100     Blood Bank Blood Product Expiration Date [id number]     Crossmatch Result Compatible    Basic Metabolic Panel    Collection Time: 06/17/24 12:00 PM   Result Value Ref Range    Sodium 148 (H) 136 - 145 mmol/L    Potassium 3.2 (L) 3.5 - 5.5 mmol/L    Chloride 121 (H) 100 - 111 mmol/L    CO2 20 (L) 21 - 32 mmol/L    Anion Gap 7 3.0 - 18 mmol/L    Glucose 157 (H) 74 - 99 mg/dL    BUN 35 (H) 7.0 - 18 MG/DL    Creatinine 1.53 (H) 0.6 - 1.3 MG/DL    BUN/Creatinine Ratio 23 (H) 12 - 20      Est, Glom Filt Rate 49 (L) >60 ml/min/1.73m2    Calcium 7.6 (L) 8.5 - 10.1 MG/DL   POCT Glucose    Collection Time: 06/17/24  3:10 PM   Result Value Ref Range    POC Glucose 118 (H) 70 - 110 mg/dL   POCT Glucose    Collection Time: 06/17/24  9:31 PM   Result Value Ref Range    POC Glucose 106 70 - 110 mg/dL   CBC with Auto Differential    Collection Time: 06/18/24  2:14 AM   Result Value Ref Range    WBC 11.6 4.6 - 13.2 K/uL    RBC 2.34 (L) 4.35 - 5.65 M/uL    Hemoglobin 6.8 (L) 13.0 - 16.0 g/dL    Hematocrit 21.2 (L) 36.0 - 48.0 %    MCV 90.6 78.0 - 100.0 FL    MCH 29.1 24.0 - 34.0 PG    MCHC 32.1 31.0 - 37.0 g/dL    RDW 16.3 (H) 11.6 - 14.5 %    Platelets 124 (L) 135 - 420 K/uL    MPV 11.0 9.2 - 11.8 FL    Nucleated RBCs 0.2 (H) 0  WBC    nRBC 0.02 (H) 0.00 - 0.01 K/uL    Neutrophils % 67 40 - 73 %    Lymphocytes % 19 (L) 21 - 52 %    Monocytes % 7 3 - 10 %    Eosinophils % 4 0 - 5 %    Basophils % 0 0 - 2 %    Immature Granulocytes % 2 (H) 0.0 - 0.5 %    Neutrophils Absolute 7.8 1.8 - 8.0 K/UL    Lymphocytes Absolute 2.2 0.9 - 3.6 K/UL    Monocytes Absolute 0.9 0.05 - 1.2 K/UL    Eosinophils Absolute 0.5 (H) 0.0 - 0.4 K/UL    Basophils Absolute 0.0 0.0 - 0.1 K/UL    Immature Granulocytes Absolute 0.2 (H) 0.00 - 0.04 K/UL    Differential Type AUTOMATED     Basic Metabolic Panel    Collection Time:

## 2024-06-18 NOTE — PROGRESS NOTES
[facility]  Transfer acceptance note    Patient: Alycia Clement Age: 69 y.o. : 1955 MR#: [id number] SSN: [id number]  Date/Time: 2024    Subjective:   Subjective   No acute events overnight, no new concerns or complaints, vitals stable.  Patient transferred from ICU to stepdown    ROS   Assessment/Plan:   Shock  Septic shock  Complicated UTI  Hemorrhagic shock  Acute hypoxic respiratory failure  Chronic indwelling Malin  Acute GI bleed  Acute blood loss anemia  Acute encephalopathy  Hyponatremia  Hypokalemia  Hyperchloremia  Hypophosphatemia  LORETTA on CKD 3  NG MA  BPH  History of hemorrhagic stroke with  shunt  Pseudohypoglycemia  Mild hypernatremia      Plan  Transfer from ICU to stepdown.  Patient off of all sedation off of all vasopressors at this time.  Specialties consulted for assistance including GI for GI bleed along with general surgery for same  Home medications as tolerated or needed  Continue monitoring for any bleeding including serial hemoglobins and transfusion to goal of 7.0.  If active bleeding consult interventional radiology for embolization.  Infectious disease consulted appreciate their assistance with antibiotics    Disposition: Expected location: To be determined    Expected timeframe: To be determined       Case discussed with:  [x]Patient  []Family  [x]Nursing  [x]Case Management  DVT Prophylaxis:  []Lovenox  []Hep SQ  [x]SCDs  []Coumadin   []On Heparin gtt   [] DOAC    Objective:   Objective:  Vital signs: (most recent): Blood pressure 126/62, pulse 63, temperature 98.6 °F (37 °C), temperature source Oral, resp. rate 28, height 1.778 m (5' 10\"), weight 77.6 kg (171 lb), SpO2 100 %.         Labs:    Recent Results (from the past 24 hour(s))   POCT Glucose    Collection Time: 24  9:31 PM   Result Value Ref Range    POC Glucose 106 70 - 110 mg/dL   CBC with Auto Differential    Collection Time: 24  2:14 AM   Result Value Ref  Range    WBC 11.6 4.6 - 13.2 K/uL    RBC 2.34 (L) 4.35 - 5.65 M/uL    Hemoglobin 6.8 (L) 13.0 - 16.0 g/dL    Hematocrit 21.2 (L) 36.0 - 48.0 %    MCV 90.6 78.0 - 100.0 FL    MCH 29.1 24.0 - 34.0 PG    MCHC 32.1 31.0 - 37.0 g/dL    RDW 16.3 (H) 11.6 - 14.5 %    Platelets 124 (L) 135 - 420 K/uL    MPV 11.0 9.2 - 11.8 FL    Nucleated RBCs 0.2 (H) 0  WBC    nRBC 0.02 (H) 0.00 - 0.01 K/uL    Neutrophils % 67 40 - 73 %    Lymphocytes % 19 (L) 21 - 52 %    Monocytes % 7 3 - 10 %    Eosinophils % 4 0 - 5 %    Basophils % 0 0 - 2 %    Immature Granulocytes % 2 (H) 0.0 - 0.5 %    Neutrophils Absolute 7.8 1.8 - 8.0 K/UL    Lymphocytes Absolute 2.2 0.9 - 3.6 K/UL    Monocytes Absolute 0.9 0.05 - 1.2 K/UL    Eosinophils Absolute 0.5 (H) 0.0 - 0.4 K/UL    Basophils Absolute 0.0 0.0 - 0.1 K/UL    Immature Granulocytes Absolute 0.2 (H) 0.00 - 0.04 K/UL    Differential Type AUTOMATED     Basic Metabolic Panel    Collection Time: 06/18/24  2:14 AM   Result Value Ref Range    Sodium 145 136 - 145 mmol/L    Potassium 3.7 3.5 - 5.5 mmol/L    Chloride 120 (H) 100 - 111 mmol/L    CO2 21 21 - 32 mmol/L    Anion Gap 4 3.0 - 18 mmol/L    Glucose 122 (H) 74 - 99 mg/dL    BUN 30 (H) 7.0 - 18 MG/DL    Creatinine 1.51 (H) 0.6 - 1.3 MG/DL    BUN/Creatinine Ratio 20 12 - 20      Est, Glom Filt Rate 50 (L) >60 ml/min/1.73m2    Calcium 7.7 (L) 8.5 - 10.1 MG/DL   Magnesium    Collection Time: 06/18/24  2:14 AM   Result Value Ref Range    Magnesium 2.5 1.6 - 2.6 mg/dL   Phosphorus    Collection Time: 06/18/24  2:14 AM   Result Value Ref Range    Phosphorus 2.7 2.5 - 4.9 MG/DL   PREPARE RBC (CROSSMATCH), 1 Units    Collection Time: 06/18/24  2:45 AM   Result Value Ref Range    History Check Historical check performed    POCT Glucose    Collection Time: 06/18/24  4:49 AM   Result Value Ref Range    POC Glucose 107 70 - 110 mg/dL   Hemoglobin and Hematocrit    Collection Time: 06/18/24 11:30 AM   Result Value Ref Range    Hemoglobin 8.4 (L) 13.0 -

## 2024-06-18 NOTE — PROGRESS NOTES
Speech Pathology    Bedside swallow eval completed with recs of puree and nectar thick liquids. Per RN, Pt may need to begin on full clear liquid diet. She will discuss with MD/NP and will reference speech eval report for diet recs once Pt is cleared by MD. Full report to follow.     Thank you for this referral.    Jade Lucia M.S. CCC-SLP  Speech Language Pathologist

## 2024-06-18 NOTE — PLAN OF CARE
70 Y/o male full code    Arrived 06/12/24 hypotensive and tachycardia  Had a hematocrit of 5. Lactic acid 6.1 having black tarry stools. Admitted GI bleeding acute anemia    History dementia, chronic kidney disease, CVA with right side deficits upper and lower, BPH, indwelling chronic catheter,  shunt placed post Hemorrhagic stroke, urinary retention,   No allergies  Neuro no fevers, denies all pain  Resp  Cardiac sinus bradycardia one unit of RBC this am.  GI NPO barium swallow today, on Protonix continuous drip at 10 per hr   friedman, chronic was changed out in the ED. BM black tarry large this morning 06/18/24    Skin  Lines: central line Right femoral        Problem: Discharge Planning  Goal: Discharge to home or other facility with appropriate resources  Outcome: Progressing  Flowsheets (Taken 6/17/2024 2143)  Discharge to home or other facility with appropriate resources:   Identify barriers to discharge with patient and caregiver   Arrange for needed discharge resources and transportation as appropriate   Identify discharge learning needs (meds, wound care, etc)     Problem: Safety - Adult  Goal: Free from fall injury  Outcome: Progressing     Problem: Pain  Goal: Verbalizes/displays adequate comfort level or baseline comfort level  Outcome: Progressing     Problem: ABCDS Injury Assessment  Goal: Absence of physical injury  Outcome: Progressing     Problem: Skin/Tissue Integrity  Goal: Absence of new skin breakdown  Outcome: Progressing     Problem: Nutrition Deficit:  Goal: Optimize nutritional status  Outcome: Progressing     Problem: Safety - Medical Restraint  Goal: Remains free of injury from restraints (Restraint for Interference with Medical Device)  Outcome: Completed  Flowsheets (Taken 6/17/2024 1400 by Astrid Reese RN)  Remains free of injury from restraints (restraint for interference with medical device):   Determine that other, less restrictive measures have been tried or would not be

## 2024-06-18 NOTE — PROGRESS NOTES
In Patient Progress note      Admit Date: 6/12/2024      Impression:     1) LORETTA on CKD3a  baseline creat 1.1-1.5  2) Etiology prerenal azotemia -> improving   3) Hypernatremia -improving with d5w  4) Hypokalemia , replace aggressively   5) Metabolic acidoses improving  6) Bradycardia      Plan:  1) d5w + 20 meq of Kcl @ 50 cc/hrs   2) monitor electrolytes and renal functions  daily  3) avoid NSAIds , IV contrast  4) renally dose AB for egfr < 30     Please call with questions     Santana Earl MD FASN  Cell [phone]  Pager: [phone]  Fax   [phone]     Subjective:     - No acute over night events.    Objective:     BP (!) 142/64   Pulse 64   Temp 97.9 °F (36.6 °C) (Axillary)   Resp 29   Ht 1.778 m (5' 10\")   Wt 77.6 kg (171 lb)   SpO2 100%   BMI 24.54 kg/m²       Intake/Output Summary (Last 24 hours) at 6/18/2024 1457  Last data filed at 6/18/2024 1300  Gross per 24 hour   Intake 2887.53 ml   Output 1725 ml   Net 1162.53 ml       Physical Exam:     Gen NAD  HENT mmm  RS AEBE   CVS s1s2 wnl no JVD  Ext edema +  Skin no rashe    Data Review:    Recent Labs     06/18/24  0214 06/18/24  1130   WBC 11.6  --    RBC 2.34*  --    HCT 21.2* 25.7*   MCV 90.6  --    MCH 29.1  --    MCHC 32.1  --    RDW 16.3*  --    MPV 11.0  --      Recent Labs     06/15/24  1747 06/16/24  0237 06/17/24  0304 06/17/24  1200 06/18/24  0214   BUN 82* 72* 47* 35* 30*   K 3.0* 3.4* 3.1* 3.2* 3.7   * 154* 150* 148* 145   * 128* 122* 121* 120*   CO2 18* 19* 20* 20* 21   PHOS  --  1.8* 3.4  --  2.7       Santana Earl MD

## 2024-06-18 NOTE — PROGRESS NOTES
1400: Report given by BRITANY Tuttle via telephone.    1500: 4 Eyes Skin Assessment     NAME:  Alycia Clement  YOB: 1955  MEDICAL RECORD NUMBER:  [id number]    The patient is being assessed for  Admission    I agree that at least one RN has performed a thorough Head to Toe Skin Assessment on the patient. ALL assessment sites listed below have been assessed.      Areas assessed by both nurses:    Head, Face, Ears, Shoulders, Back, Chest, Arms, Elbows, Hands, Sacrum. Buttock, Coccyx, Ischium, Legs. Feet and Heels, and Under Medical Devices         Does the Patient have a Wound? No noted wound(s)    Scar present on pt sacrum from previously healed sacral wound       Andrei Prevention initiated by RN: Yes  Wound Care Orders initiated by RN: No    Pressure Injury (Stage 3,4, Unstageable, DTI, NWPT, and Complex wounds) if present, place Wound referral order by RN under : No    New Ostomies, if present place, Ostomy referral order under : No     Nurse 1 eSignature: BRITANY Acuna    **SHARE this note so that the co-signing nurse can place an eSignature**    Nurse 2 eSignature: BRITANY Lee      1920: Bedside and Verbal shift change report given to BRITANY Huffman (oncoming nurse) by BRITANY Acuna (offgoing nurse). Report included the following information Nurse Handoff Report, Index, Adult Overview, Intake/Output, MAR, Cardiac Rhythm NSR, Neuro Assessment, and Event Log.

## 2024-06-18 NOTE — PLAN OF CARE
Problem: SLP Adult - Impaired Swallowing  Goal: By Discharge: Advance to least restrictive diet without signs or symptoms of aspiration for planned discharge setting.  See evaluation for individualized goals.  Description: Patient will:  1. Tolerate PO trials with 0 s/s overt distress in 4/5 trials  2. Utilize compensatory swallow strategies/maneuvers (decrease bite/sip, size/rate, alt. liq/sol) with min cues in 4/5 trials  3. Perform oral-motor/laryngeal exercises to increase oropharyngeal swallow function with min cues  4. Complete an objective swallow study (i.e., MBSS) to assess swallow integrity, r/o aspiration, and determine of safest LRD, min A as indicated/ordered by MD     Rec:     Puree diet with nectar thick liquids  Aspiration precautions  HOB >45 during po intake, remain >30 for 30-45 minutes after po   Small bites/sips; alternate liquid/solid with slow feeding rate   Oral care TID  Meds per pt preference      Outcome: Progressing   SPEECH LANGUAGE PATHOLOGY BEDSIDE SWALLOW EVALUATION/TREATMENT    Patient: Alycia Clement (69 y.o. male)  Date: 6/18/2024  Primary Diagnosis: Septic shock (HCC) [A41.9, R65.21]  Procedure(s) (LRB):  ESOPHAGOGASTRODUODENOSCOPY (N/A) 4 Days Post-Op   Precautions: Aspiration  PLOF: As per H&P  ASSESSMENT:  Based on the objective data described below, the patient presents with mild oropharyngeal dysphagia. Pt s/p extubation on 6/17/24 presenting with watery eyes and change in vocal quality with thin liquids +/- straw. Cued volitional cough was weak/wet. Pt tolerated puree, honey, and nectar thick liquids +/- straw without s/s of aspiration. Reg solids not administered due to Pt reporting puree solids at prior level and declining solids at this time. Pt displays mild dysarthria, hoarse vocal quality, and low volume. Pt reports his speech is \"slurred since before\". Positive oral clearance and timely swallow initiation with PO. Weak/decreased laryngeal elevation to palpation with all

## 2024-06-18 NOTE — PALLIATIVE CARE
Palliative Medicine     Palliative Medicine team members Melissa Beck, NAVYA and this writer for follow up visit at [facility]. Mr. Clement was medically extubated on 6/17. Patient was sitting up in bed, awake and alert. He was able to answer simple questions. He had no complaints of pain of breathing issues. He was not aware of where he was.  He is not able to engage in complex medical decisions.     Call placed to Daughter, Jade Lamas. Reintroduced the role of palliative medicine. She recalled speaking with me last week. She shared she has been getting updates for the ICU.  This writer readdressed goals of care including CPR and intubation. Reviewed patient's risk for aspiration which could lead to infection.  She stated she has not addressed this question to her brother or aunt. This writer encouraged her and family to have further goals of care discussions for future planning regarding what pt would/would not find acceptable in his medical care. Explained importance that discussing these difficult topics now while it is not a crisis situation. Ms. Lamas expressed understanding.       CODE STATUS - FULL CODE WITH FULL INTERVENTIONS.     Marry EVERETTN, RN, PN  Palliative Medicine Inpatient RN  Palliative [facility] Line: [phone]

## 2024-06-18 NOTE — PROGRESS NOTES
[email or web address]  [phone]    Gastroenterology follow up-Progress note    Impression:  1. Septic Shock - complicated UTI w/ hx chronic indwelling friedman  - recent hospitalization @[facility] last month for same  2. Acute on chronic anemia - large high risk gastric ulcer noted on EGD 6/14  - 6/18/24: pt had large melenic stool NOC, Hgb 6.8 today  -EGD 6/14/2024-large amount of blood in stomach-fundus could not be cleared; 2 cm deep proximal duodenal bulb ulcer with overlying clot and high risk stigmata-not amenable to endoscopic therapy.  - past history of PUD w/ EGD 11/2022 w/ non-bleeding gastric ulcer w/ no signs of recent bleed, duodenitis, gastric bx: reactive gastropathy gastric ulcer: focally denuded mucosa with chronic inflammation  3. Hx CVA w/ chronic R sided deficits, s/p  Shunt 2014  4. Acute metabolic/toxic encephalopathy  5. LORETTA on CKD3    Plan:  1. Continue PPI: okay for high dose bid dosing, d/c drip  2. If there are signs of active ongoing bleeding, recommend IR embolization of Gastroduodenal artery. High risk candidate for laparotomy, but may have to consider that as a last resort. Appreciate Dr. Avila's inputs.   3. Monitor H/H, transfuse per protocol  4. Continue medical management per primary team   5. Clear liquids as long as HH stable and no overt signs of bleeding, do not advance beyond clears at this time.    Chief Complaint: acute anemia    Subjective:  Pt seen in ICU; hemodynamically stable, extubated yesterday. Able to participate minimally in conversation today. Denies ABD pain/discomfort, has not noted signs of bleeding (primary RN stated he had large, melenic stool overnight).    ROS: mild ABD discomfort    General: Alert, RUE contracture, participated in conversation with thumbs up/down  Cardiovascular: not tachycardic  Pulmonary: extubated yesterday, no dyspnea noted  Abdominal: appearance normal; soft, nontender  Neuro: Alert, slurred speech, appropriately responds to questions     MPV 11.0 06/18/2024 02:14 AM    Lab Results   Component Value Date/Time    BANDS 1 06/14/2024 03:01 AM      Basic Metabolic Profile   Recent Labs     06/18/24  0214      K 3.7   *   CO2 21   BUN 30*   GLUCOSE 122*   MG 2.5   PHOS 2.7          Hepatic Function    No results found for: \"TP\" No components found for: \"SGOT\", \"GPT\", \"DBILI\", \"TBIL\"       Coags   No results for input(s): \"INR\", \"APTT\" in the last 72 hours.    Invalid input(s): \"PTP\"              Maggie Bruno NP-C  6/18/2024, 10:13 AM   [facility].  [email or web address]  Office: [phone]  [facility] Cell: [phone] (M-F 7:30-4:30)

## 2024-06-18 NOTE — INTERDISCIPLINARY ROUNDS
[facility]  Pulmonary, Critical Care, and Sleep Medicine  Interdisciplinary and Ventilator Weaning Rounds    Patient discussed in morning walking rounds and interdisciplinary rounds.    ICU admission day: 6/12    Overnight events:   No acute overnight events    Assessments and best practice:  Ventilator  Vent Settings  FiO2 : 28 %  Resp Rate (Set): 16 bpm  PEEP/CPAP (cmH2O): 5  Insp Time (sec): 0.8 sec  Insp Rise Time (%): 50 %  Pressure Support (cm H2O): 12 cm H2O  Flow Sensitivity: 3 L/min  Disconnect Sensitivity (%): 75 %  Expiratory Sensitivity (%): 25 %  Glycemic control  Diet  Diet NPO  Stress ulcer prophylaxis.  Protonix  DVT prophylaxis.  Not indicated 2/2 anemia  Need for Lines, friedman assessed.  Yes  Restraint Reevaluation   N/A  Disposition regarding transferring out of the ICU  RED      Family contact/MPOA: Jade Vasquez, daughter  Family updated  yesterday at bedside    Palliative consult within 3 days of admission to ICU- yes  Ethics Guidance: 21 days      Daily Plans:    BHUPINDER time 10 minutes        Mary Marks RN   Pulmonary, Critical Care Medicine  [facility]

## 2024-06-18 NOTE — PROGRESS NOTES
[facility].  Pulmonary, Critical Care, and Sleep Medicine    Name: Alycia Clement MRN: [id number]   : 1955 Hospital: [facility]   Date: 2024  Admission Date: 2024     Chart and notes reviewed. Data reviewed. I have evaluated all findings.    [x]I have reviewed the flowsheet and previous day’s notes.    [x]The patient is unable to give any meaningful history or review of systems because the patient is:  [x]Intubated []Sedated   []Unresponsive      [x]The patient is critically ill on      [x]Mechanical ventilation []Pressors   []BiPAP []         Interval HPI:  Patient is a 69 y.o. male with PMHx of previous CVA with residual right hemiparesis and aphasia, BPH with indwelling Malin catheter, history of  shunt, CKD, GERD, multiple admission for UTI, brought in by EMS from nursing facility due to altered mental status, unknown last known well. Pt tachycardic, hypotensive, and short of breath on presentation. Work-up significant for leukocytosis, WBC 19k, lactic acidosis, anemia with Hgb of 5, LORETTA, hyperglycemia. UA positive for LE, pyuria, bacteruria. CXR negative. CTH negative for acute process. Pt refractory to 2 L IVF so started on Levophed and right fe CVL inserted. Pt also receiving 2 units PRBC. GI was consulted with plans to perform EGD in the morning pending clinical course. Pt will be admitted to our service.   : Pt rapidly decompensated hemodynamically and was found to have bright red blood in his mouth. Pt was emergently intubated. Additional blood products ordered and started on Octreotide.     EGD 2024-large amount of blood in stomach-fundus could not be cleared; 2 cm deep proximal duodenal bulb ulcer with overlying clot and high risk stigmata-not amenable to endoscopic therapy.     Subjective 24  Hospital Day: 4  Vent Day: 24  Overnight events:No acute events. Extubated yesterday  Mentation/Activity: Alert, follows simple  chronic indwelling Malin, changed in the ER  Hx of Left thalamic intracranial hemorrhage with intraventricular hemorrhage s/p  shunt (2014)- baseline right side hemiparesis and facial droop     Patient Active Problem List   Diagnosis    Urine retention    Urinary tract infection without hematuria    Acute cystitis without hematuria    Acute respiratory failure with hypoxemia (HCC)    LORETTA (acute kidney injury) (HCC)    Anemia    Backache    BPH (benign prostatic hyperplasia)    Chronic neck pain    Constipation    Dementia (HCC)    Environmental and seasonal allergies    Essential hypertension    Gastroesophageal reflux disease without esophagitis    Hemiparesis affecting right side as late effect of stroke (HCC)    High anion gap metabolic acidosis    Hydrocephalus, communicating (HCC)    Neutrophilic leukocytosis    Other acute kidney failure (HCC)    Other hyperlipidemia    Prediabetes    Recurrent major depressive disorder, in partial remission (HCC)    Septic shock (HCC)    Sleep difficulties    Vitamin D deficiency    Hemorrhagic shock (HCC)    Acute respiratory failure with hypoxia (HCC)    Goals of care, counseling/discussion    Encounter for palliative care    Acute encephalopathy    Acute blood loss anemia    History of CVA with residual deficit    History of creation of ventriculoperitoneal shunt    Debility    Septic shock due to urinary tract infection (HCC)        RECOMMENDATIONS:   Neuro:Titrate sedation for RASS goal 0 to -1, daily sedation holiday.       Pulm: Aspiration precautions, HOB>30'. VAP Bundle.  PSBT in AM.     CVS: Actively titrate vasopressors aim MAP >65mmHg, Echo showed hyperdynamic LV systolic function EF:55-60%.  Titrate Dopamine for HR>40  Fluids: D5W at 75 cc/hr     GI: Trend LFTs, Zofran PRN for N/V, Passed SLP, started on dysphagia, OK with Gen surgery  -Off octreotide drip.  Discussed with GI and recommended Protonix drip which has been ordered.  -Gen surgery following due to

## 2024-06-18 NOTE — PROGRESS NOTES
4 Eyes Skin Assessment     NAME:  Alycia Clement  YOB: 1955  MEDICAL RECORD NUMBER:  [id number]    The patient is being assessed for  Shift Handoff    I agree that at least one RN has performed a thorough Head to Toe Skin Assessment on the patient. ALL assessment sites listed below have been assessed.      Areas assessed by both nurses:    Head, Face, Ears, Shoulders, Back, Chest, Sacrum. Buttock, Coccyx, Ischium, and Under Medical Devices         Does the Patient have a Wound? No noted wound(s)       Andrei Prevention initiated by RN: Yes  Wound Care Orders initiated by RN: No    Pressure Injury (Stage 3,4, Unstageable, DTI, NWPT, and Complex wounds) if present, place Wound referral order by RN under : No    New Ostomies, if present place, Ostomy referral order under : No     Nurse 1 eSignature: Electronically signed by Luis Altamirano RN on 6/18/24 at 7:28 AM EDT    **SHARE this note so that the co-signing nurse can place an eSignature**    Nurse 2 eSignature: {Esignature:[id number]}

## 2024-06-18 NOTE — PROGRESS NOTES
Palliative Medicine Initial Consult  Patient Name: Alycia Clement  YOB: 1955  MRN: [id number]  Age: 69 y.o.  Gender: male    Date of Initial Consult: 6/13/2024  Date of Service: 6/18/2024  Time: 12:31 PM  Provider: RONI Purcell NP  Hospital Day: 7  Admit Date: 6/12/2024  Referring Provider: Juany RICHTER       Reasons for Consultation:  Goals of Care    HISTORY OF PRESENT ILLNESS (HPI):   Alycia Clement is a 69 y.o. male with a past medical history of hemorrhagic stroke with right hemiparesis,  shunt placement in 2014, hypertension, chronic kidney disease, chronic friedman catheter, sepsis related to urinary tract infections, who was admitted on 6/12/[facility] with a diagnosis of septic shock related to UTI.  The patient was at his adult  of which he attends twice weekly. He resides at [facility]. He was \"more altered than normal\" per notes.  Per chart review the patient was recently discharged from [facility] in May 2024 for severe sepsis and septic shock related to a UTI.  Palliative care was consulted for goals of care.    Psychosocial: The patient lives at [facility].  He is a patient of the [facility] program and attends adult  about twice a week per daughter.  He is not , he has 2 daughters and 1 son.  He has a sister named Margarita Clement.  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    6/18/2024 Mr Clement is now medically extubated, alert confused, denies pain.     6/13/2024 Patient seen in ICU. Daughter was at the bedside.  He opened his eyes but then quickly closed them.  Bedside nurse was in the room.  Patient intubated, on multiple vasopressor support and sedated with IV fentanyl.       PALLIATIVE DIAGNOSES:    Goals of care  Encounter for palliative care  Septic shock secondary to UTI  Acute Encephalopathy  Acute blood loss anemia  History of CVA

## 2024-06-18 NOTE — CONSULTS
Infectious Disease Consultation Note        Reason:septic shock    Current abx Prior abx   Piperacillin/tazobactam 6/12;  since 6/13   Vancomycin 6/12-6/13     Lines:       Assessment :  69 y.o. 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     Hospitalization at [facility] in May 2024- High grade Enterobacter cloacae septicemia of unclear source  --Blood cultures:  --5/6: Enterobacter cloacae (S Cefepime ROGER <=1)  --5/8: No growth  Patient completed 2 weeks of cefepime treatment on 5/21/2024.  PICC line was subsequently removed.  Patient followed up with [facility] infectious disease on 5/31/2024 at which time patient was doing well.      Clinical presentation c/w septic versus hemorrhagic shock (POA).   Significant pyuria noted on urine analysis, urine culture 6/12-greater than 100,000 colonies of Klebsiella concerning for septic shock secondary to cystitis.  However patient has chronic indwelling Malin catheter which could lead to asymptomatic pyuria/bacteriuria.    GI bleed/significant drop in H&H on admission concerning for hemorrhagic shock.  GI evaluation appreciated.  H&H stable at this time.  Plans for IR guided embolization if further drop in H&H noted.    Patient is clinically improving.  Off pressors.    Acute on chronic kidney injury: Prerenal versus ATN.  Improving    Recommendations:    Discontinue piperacillin/tazobactam-start ceftriaxone  Follow-up GI recommendations  Monitor CBC, temperature, clinically    Thank you for consultation request. Above plan was discussed in details with patient, primary team. Please call me if any further questions or concerns. Will continue to participate in the care of this patient.    HPI:    69 y.o. male with PMHx of previous CVA with residual right hemiparesis and  tablet 16 g  4 tablet Oral PRN    dextrose bolus 10% 125 mL  125 mL IntraVENous PRN    Or    dextrose bolus 10% 250 mL  250 mL IntraVENous PRN    Glucagon Emergency SOLR 1 mg  1 mg SubCUTAneous PRN    dextrose 10 % infusion   IntraVENous Continuous PRN    insulin lispro (HUMALOG,ADMELOG) injection vial 0-4 Units  0-4 Units SubCUTAneous Q6H    0.9 % sodium chloride infusion   IntraVENous PRN    sodium chloride flush 0.9 % injection 5-40 mL  5-40 mL IntraVENous 2 times per day    sodium chloride flush 0.9 % injection 5-40 mL  5-40 mL IntraVENous PRN    0.9 % sodium chloride infusion   IntraVENous PRN    potassium chloride (KLOR-CON M) extended release tablet 40 mEq  40 mEq Oral PRN    Or    potassium bicarb-citric acid (EFFER-K) effervescent tablet 40 mEq  40 mEq Oral PRN    Or    potassium chloride 10 mEq/100 mL IVPB (Peripheral Line)  10 mEq IntraVENous PRN    magnesium sulfate 2000 mg in 50 mL IVPB premix  2,000 mg IntraVENous PRN    ondansetron (ZOFRAN-ODT) disintegrating tablet 4 mg  4 mg Oral Q8H PRN    Or    ondansetron (ZOFRAN) injection 4 mg  4 mg IntraVENous Q6H PRN    acetaminophen (TYLENOL) tablet 650 mg  650 mg Oral Q6H PRN    Or    acetaminophen (TYLENOL) suppository 650 mg  650 mg Rectal Q6H PRN       Allergies: Patient has no known allergies.    Family History   Problem Relation Age of Onset    Stroke Brother     Hypertension Mother     Stroke Mother     Diabetes Sister     Hypertension Sister     Diabetes Brother     Diabetes Mother     Hypertension Brother      Social History     Socioeconomic History    Marital status:      Spouse name: Not on file    Number of children: Not on file    Years of education: Not on file    Highest education level: Not on file   Occupational History    Not on file   Tobacco Use    Smoking status: Former     Current packs/day: 1.00     Types: Cigarettes    Smokeless tobacco: Never   Vaping Use    Vaping Use: Never used   Substance and Sexual Activity    Alcohol

## 2024-06-19 LAB
ABO + RH BLD: NORMAL
ANION GAP SERPL CALC-SCNC: 6 MMOL/L (ref 3–18)
BASOPHILS # BLD: 0 K/UL (ref 0–0.1)
BASOPHILS NFR BLD: 0 % (ref 0–2)
BLD PROD TYP BPU: NORMAL
BLOOD BANK BLOOD PRODUCT EXPIRATION DATE: NORMAL
BLOOD BANK DISPENSE STATUS: NORMAL
BLOOD BANK ISBT PRODUCT BLOOD TYPE: 5100
BLOOD BANK UNIT TYPE AND RH: NORMAL
BLOOD GROUP ANTIBODIES SERPL: NORMAL
BPU ID: NORMAL
BUN SERPL-MCNC: 19 MG/DL (ref 7–18)
BUN/CREAT SERPL: 15 (ref 12–20)
CALCIUM SERPL-MCNC: 7.8 MG/DL (ref 8.5–10.1)
CALLED TO: NORMAL
CHLORIDE SERPL-SCNC: 118 MMOL/L (ref 100–111)
CO2 SERPL-SCNC: 18 MMOL/L (ref 21–32)
CREAT SERPL-MCNC: 1.29 MG/DL (ref 0.6–1.3)
CROSSMATCH RESULT: NORMAL
DIFFERENTIAL METHOD BLD: ABNORMAL
EOSINOPHIL # BLD: 0 K/UL (ref 0–0.4)
EOSINOPHIL NFR BLD: 0 % (ref 0–5)
ERYTHROCYTE [DISTWIDTH] IN BLOOD BY AUTOMATED COUNT: 15.9 % (ref 11.6–14.5)
GLUCOSE BLD STRIP.AUTO-MCNC: 103 MG/DL (ref 70–110)
GLUCOSE BLD STRIP.AUTO-MCNC: 86 MG/DL (ref 70–110)
GLUCOSE BLD STRIP.AUTO-MCNC: 88 MG/DL (ref 70–110)
GLUCOSE BLD STRIP.AUTO-MCNC: 92 MG/DL (ref 70–110)
GLUCOSE BLD STRIP.AUTO-MCNC: 96 MG/DL (ref 70–110)
GLUCOSE SERPL-MCNC: 102 MG/DL (ref 74–99)
HCT VFR BLD AUTO: 25.3 % (ref 36–48)
HCT VFR BLD AUTO: 25.7 % (ref 36–48)
HGB BLD-MCNC: 8.1 G/DL (ref 13–16)
HGB BLD-MCNC: 8.2 G/DL (ref 13–16)
IMM GRANULOCYTES # BLD AUTO: 0 K/UL (ref 0–0.04)
IMM GRANULOCYTES NFR BLD AUTO: 0 % (ref 0–0.5)
LYMPHOCYTES # BLD: 1.2 K/UL (ref 0.9–3.6)
LYMPHOCYTES NFR BLD: 11 % (ref 21–52)
MAGNESIUM SERPL-MCNC: 2.2 MG/DL (ref 1.6–2.6)
MCH RBC QN AUTO: 28.9 PG (ref 24–34)
MCHC RBC AUTO-ENTMCNC: 32 G/DL (ref 31–37)
MCV RBC AUTO: 90.4 FL (ref 78–100)
MONOCYTES # BLD: 0.1 K/UL (ref 0.05–1.2)
MONOCYTES NFR BLD: 1 % (ref 3–10)
NEUTS SEG # BLD: 9.5 K/UL (ref 1.8–8)
NEUTS SEG NFR BLD: 88 % (ref 40–73)
NRBC # BLD: 0 K/UL (ref 0–0.01)
NRBC BLD-RTO: 0 PER 100 WBC
PHOSPHATE SERPL-MCNC: 3.1 MG/DL (ref 2.5–4.9)
PLATELET # BLD AUTO: 148 K/UL (ref 135–420)
PLATELET COMMENT: ABNORMAL
PMV BLD AUTO: 11.1 FL (ref 9.2–11.8)
POTASSIUM SERPL-SCNC: 3.6 MMOL/L (ref 3.5–5.5)
RBC # BLD AUTO: 2.8 M/UL (ref 4.35–5.65)
RBC MORPH BLD: ABNORMAL
SODIUM SERPL-SCNC: 142 MMOL/L (ref 136–145)
SPECIMEN EXP DATE BLD: NORMAL
UNIT DIVISION: 0
UNIT ISSUE DATE/TIME: NORMAL
WBC # BLD AUTO: 10.8 K/UL (ref 4.6–13.2)

## 2024-06-19 PROCEDURE — 2580000003 HC RX 258: Performed by: INTERNAL MEDICINE

## 2024-06-19 PROCEDURE — 36415 COLL VENOUS BLD VENIPUNCTURE: CPT

## 2024-06-19 PROCEDURE — 85025 COMPLETE CBC W/AUTO DIFF WBC: CPT

## 2024-06-19 PROCEDURE — 2140000001 HC CVICU INTERMEDIATE R&B

## 2024-06-19 PROCEDURE — 6360000002 HC RX W HCPCS: Performed by: INTERNAL MEDICINE

## 2024-06-19 PROCEDURE — 2700000000 HC OXYGEN THERAPY PER DAY

## 2024-06-19 PROCEDURE — 92526 ORAL FUNCTION THERAPY: CPT

## 2024-06-19 PROCEDURE — C9113 INJ PANTOPRAZOLE SODIUM, VIA: HCPCS | Performed by: STUDENT IN AN ORGANIZED HEALTH CARE EDUCATION/TRAINING PROGRAM

## 2024-06-19 PROCEDURE — 82962 GLUCOSE BLOOD TEST: CPT

## 2024-06-19 PROCEDURE — 83735 ASSAY OF MAGNESIUM: CPT

## 2024-06-19 PROCEDURE — 84100 ASSAY OF PHOSPHORUS: CPT

## 2024-06-19 PROCEDURE — 85014 HEMATOCRIT: CPT

## 2024-06-19 PROCEDURE — 80048 BASIC METABOLIC PNL TOTAL CA: CPT

## 2024-06-19 PROCEDURE — 2580000003 HC RX 258: Performed by: STUDENT IN AN ORGANIZED HEALTH CARE EDUCATION/TRAINING PROGRAM

## 2024-06-19 PROCEDURE — 99233 SBSQ HOSP IP/OBS HIGH 50: CPT | Performed by: SURGERY

## 2024-06-19 PROCEDURE — 85018 HEMOGLOBIN: CPT

## 2024-06-19 PROCEDURE — 2580000003 HC RX 258: Performed by: PHYSICIAN ASSISTANT

## 2024-06-19 PROCEDURE — 6370000000 HC RX 637 (ALT 250 FOR IP): Performed by: INTERNAL MEDICINE

## 2024-06-19 PROCEDURE — 94761 N-INVAS EAR/PLS OXIMETRY MLT: CPT

## 2024-06-19 PROCEDURE — 6360000002 HC RX W HCPCS: Performed by: STUDENT IN AN ORGANIZED HEALTH CARE EDUCATION/TRAINING PROGRAM

## 2024-06-19 PROCEDURE — 99232 SBSQ HOSP IP/OBS MODERATE 35: CPT | Performed by: STUDENT IN AN ORGANIZED HEALTH CARE EDUCATION/TRAINING PROGRAM

## 2024-06-19 RX ORDER — SODIUM BICARBONATE 650 MG/1
650 TABLET ORAL 3 TIMES DAILY
Status: DISCONTINUED | OUTPATIENT
Start: 2024-06-19 | End: 2024-06-21 | Stop reason: HOSPADM

## 2024-06-19 RX ADMIN — WATER 2000 MG: 1 INJECTION INTRAMUSCULAR; INTRAVENOUS; SUBCUTANEOUS at 21:37

## 2024-06-19 RX ADMIN — PANTOPRAZOLE SODIUM 40 MG: 40 INJECTION, POWDER, FOR SOLUTION INTRAVENOUS at 07:55

## 2024-06-19 RX ADMIN — SODIUM BICARBONATE 650 MG: 650 TABLET ORAL at 12:00

## 2024-06-19 RX ADMIN — PANTOPRAZOLE SODIUM 40 MG: 40 INJECTION, POWDER, FOR SOLUTION INTRAVENOUS at 21:37

## 2024-06-19 RX ADMIN — SODIUM CHLORIDE, PRESERVATIVE FREE 10 ML: 5 INJECTION INTRAVENOUS at 07:55

## 2024-06-19 RX ADMIN — SODIUM BICARBONATE 650 MG: 650 TABLET ORAL at 21:36

## 2024-06-19 RX ADMIN — PIPERACILLIN AND TAZOBACTAM 3375 MG: 3; .375 INJECTION, POWDER, FOR SOLUTION INTRAVENOUS at 12:11

## 2024-06-19 RX ADMIN — PIPERACILLIN AND TAZOBACTAM 3375 MG: 3; .375 INJECTION, POWDER, FOR SOLUTION INTRAVENOUS at 03:23

## 2024-06-19 RX ADMIN — SODIUM BICARBONATE 650 MG: 650 TABLET ORAL at 15:56

## 2024-06-19 RX ADMIN — SODIUM CHLORIDE, PRESERVATIVE FREE 10 ML: 5 INJECTION INTRAVENOUS at 21:49

## 2024-06-19 ASSESSMENT — PAIN SCALES - GENERAL
PAINLEVEL_OUTOF10: 0

## 2024-06-19 NOTE — PLAN OF CARE
Problem: SLP Adult - Impaired Swallowing  Goal: By Discharge: Advance to least restrictive diet without signs or symptoms of aspiration for planned discharge setting.  See evaluation for individualized goals.  Description: Patient will:  1. Tolerate PO trials with 0 s/s overt distress in 4/5 trials  2. Utilize compensatory swallow strategies/maneuvers (decrease bite/sip, size/rate, alt. liq/sol) with min cues in 4/5 trials  3. Perform oral-motor/laryngeal exercises to increase oropharyngeal swallow function with min cues  4. Complete an objective swallow study (i.e., MBSS) to assess swallow integrity, r/o aspiration, and determine of safest LRD, min A as indicated/ordered by MD     Rec:     Puree diet with nectar thick liquids  Aspiration precautions  HOB >45 during po intake, remain >30 for 30-45 minutes after po   Small bites/sips; alternate liquid/solid with slow feeding rate   Oral care TID  Meds per pt preference    Outcome: Progressing    SPEECH LANGUAGE PATHOLOGY DYSPHAGIA TREATMENT    Patient: Alycia Clement (69 y.o. male)  Date: 6/19/2024  Diagnosis: Septic shock (HCC) [A41.9, R65.21] Septic shock (HCC)  Procedure(s) (LRB):  ESOPHAGOGASTRODUODENOSCOPY (N/A) 5 Days Post-Op  Precautions: Standard, aspiration  PLOF: As per H&P      ASSESSMENT:  Pt seen for follow up dysphagia management. Pt lethargic and required max cues to arouse for PO trials. Pt nonverbal on this date only responding with a head nod given extra time. Pt tolerated nectar thick liquids + straw without overt s/s of aspiration. Positive oral clearance and reduced laryngeal elevation to palpation. Further PO trials discontinued due lethargy putting Pt at an increased risk of aspiration this am. Recommend continuation of puree and nectar thick liquids, assistance with all meals, aspiration precautions, oral care TID, and meds as tolerated. Will continue to follow.     Progression toward goals:  []         Improving appropriately and progressing  Edentulous  Oral Hygiene: Moist, Clean  Lingual: Decreased strength  Mandible: No impairment        P.O. Trials:   PO Trials  Assessment Method(s): Observation, Palpation  Patient Position: Partially reclined in bed  Vocal Quality: Hoarse, Low volume  Consistency Presented: Mildly thick  How Presented: SLP-fed/Presented, Straw  Bolus Acceptance: No impairment  Bolus Formation/Control: No impairment  Type of Impairment: Mastication  Propulsion: No impairment  Oral Residue: None  Initiation of Swallow: No impairment  Laryngeal Elevation: Decreased, Weak  Aspiration Signs/Symptoms: Change of vocal quality, Watery eyes  Pharyngeal Phase Characteristics: Altered vocal quality, Poor endurance    DYSPHAGIA DIAGNOSIS: Dysphagia Diagnosis: Mild pharyngeal stage dysphagia, Mild oral stage dysphagia    PAIN:  Start of Tx: 0, appeared to be resting comfortably  End of Tx: 0, appeared to be resting comfortably     After treatment:   []              Patient left in no apparent distress sitting up in chair  [x]              Patient left in no apparent distress in bed  [x]              Call bell left within reach  [x]              Nursing notified  []              Family present  []              Caregiver present  []              Bed alarm activated      COMMUNICATION/EDUCATION:   [x]            Aspiration precautions; swallow safety; compensatory techniques provided via demonstration, verbalization and teach back of comprehension  []         Patient/family have participated as able in goal setting and plan of care.  [x]            Patient/family agree to work toward stated goals and plan of care.  []            Patient understands intent and goals of therapy, neutral about participation.  []            Patient unable to participate in goal setting/plan of care secondary to cognition, hearing/vision deficits; education ongoing with interdisciplinary staff   []            Handout regarding diet recommendations and thickener

## 2024-06-19 NOTE — PROGRESS NOTES
[facility]  Transfer acceptance note    Patient: Alycia Clement Age: 69 y.o. : 1955 MR#: [id number] SSN: [id number]  Date/Time: 2024    Subjective:   Subjective   No acute events overnight, no new concerns or complaints, vitals stable.  Overall patient is remained stable    Review of Systems   Constitutional: Negative for fever.      Assessment/Plan:   Shock  Septic shock  Complicated UTI  Hemorrhagic shock  Acute hypoxic respiratory failure  Chronic indwelling Malin  Acute GI bleed  Acute blood loss anemia  Acute encephalopathy  Hyponatremia  Hypokalemia  Hyperchloremia  Hypophosphatemia  LORETTA on CKD 3  NG MA  BPH  History of hemorrhagic stroke with  shunt  Pseudohypoglycemia  Mild hypernatremia      Plan  Change in antibiotics per infectious disease, appreciate their assistance as always  No clear signs of bleeding, mild drop in hemoglobin since last measurement, continue to trend    Disposition: Expected location: To be determined    Expected timeframe: To be determined       Case discussed with:  [x]Patient  []Family  [x]Nursing  [x]Case Management  DVT Prophylaxis:  []Lovenox  []Hep SQ  [x]SCDs  []Coumadin   []On Heparin gtt   [] DOAC    Objective:   Objective:  General Appearance:  Comfortable, well-appearing, in no acute distress and not in pain.    Vital signs: (most recent): Blood pressure (!) 150/78, pulse 68, temperature 98.6 °F (37 °C), temperature source Oral, resp. rate 26, height 1.778 m (5' 10\"), weight 77.6 kg (171 lb), SpO2 100 %.  Vital signs are normal.  No fever.    HEENT: Normal HEENT exam.    Lungs:  Normal effort and normal respiratory rate.  Breath sounds clear to auscultation.    Heart: Normal rate.  Regular rhythm.  S1 normal and S2 normal.  No murmur, gallop or friction rub.   Chest: Symmetric chest wall expansion.   Abdomen: Abdomen is soft and non-distended.  Bowel sounds are normal.   There is no abdominal tenderness.

## 2024-06-19 NOTE — PROGRESS NOTES
In Patient Progress note      Admit Date: 6/12/2024      Impression:     1) LORETTA on CKD3a  baseline creat 1.1-1.5__ > at baseline now   2) Etiology prerenal azotemia -> improving   3) Hypernatremia - resolved  4) Hypokalemia , replete   5) Metabolic acidoses  6) Bradycardia      Plan:  1) d/c IVF and encourage po intake  2) monitor electrolytes and renal functions  daily  3) avoid NSAIds , IV contrast  4) renally dose AB for egfr < 30  5) add bicarb for acidoses      Please call with questions     Santana Earl MD FAS  Cell [phone]  Pager: [phone]  Fax   [phone]     Subjective:     - No acute over night events.    Objective:     BP (!) 145/68   Pulse 56   Temp 97.9 °F (36.6 °C) (Axillary)   Resp 28   Ht 1.778 m (5' 10\")   Wt 77.6 kg (171 lb)   SpO2 100%   BMI 24.54 kg/m²       Intake/Output Summary (Last 24 hours) at 6/19/2024 0943  Last data filed at 6/19/2024 0800  Gross per 24 hour   Intake 287.5 ml   Output 2400 ml   Net -2112.5 ml         Physical Exam:     Gen NAD  HENT mmm  RS AEBE   CVS s1s2 wnl no JVD  Ext edema +  Skin no rashe    Data Review:    Recent Labs     06/19/24  0514   WBC 10.8   RBC 2.80*   HCT 25.3*   MCV 90.4   MCH 28.9   MCHC 32.0   RDW 15.9*   MPV 11.1       Recent Labs     06/17/24  0304 06/17/24  1200 06/18/24  0214 06/19/24  0514   BUN 47* 35* 30* 19*   K 3.1* 3.2* 3.7 3.6   * 148* 145 142   * 121* 120* 118*   CO2 20* 20* 21 18*   PHOS 3.4  --  2.7 3.1         Santana Earl MD

## 2024-06-19 NOTE — PROGRESS NOTES
Bedside and Verbal shift change report given to Jade RN (oncoming nurse) by Katharina RN (offgoing nurse). Report included the following information Nurse Handoff Report.

## 2024-06-19 NOTE — PROGRESS NOTES
Infectious Disease Progress Note        Reason:septic shock    Current abx Prior abx   Piperacillin/tazobactam 6/12;  since 6/13   Vancomycin 6/12-6/13     Lines:       Assessment :  69 y.o. 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     Hospitalization at [facility] in May 2024- High grade Enterobacter cloacae septicemia of unclear source  --Blood cultures:  --5/6: Enterobacter cloacae (S Cefepime ROGER <=1)  --5/8: No growth  Patient completed 2 weeks of cefepime treatment on 5/21/2024.  PICC line was subsequently removed.  Patient followed up with [facility] infectious disease on 5/31/2024 at which time patient was doing well.      Clinical presentation c/w septic versus hemorrhagic shock (POA).   Significant pyuria noted on urine analysis, urine culture 6/12-greater than 100,000 colonies of Klebsiella concerning for septic shock secondary to cystitis.  However patient has chronic indwelling Malin catheter which could lead to asymptomatic pyuria/bacteriuria.    GI bleed/significant drop in H&H on admission concerning for hemorrhagic shock.  GI evaluation appreciated.  H&H stable at this time.  Plans for IR guided embolization if further drop in H&H noted.    Off pressors.    Acute on chronic kidney injury: Prerenal versus ATN.  Improving    Altered mentation: Likely metabolic encephalopathy metabolic    Recommendations:    Discontinue piperacillin/tazobactam-start ceftriaxone  Follow-up GI recommendations  Monitor CBC, temperature, clinically    Above plan was discussed in details with patient, primary team. Please call me if any further questions or concerns. Will continue to participate in the care of this patient.    HPI:    Lethargic.  Not very communicative    Past Medical History:   Diagnosis Date    LORETTA  were made to correct all the mistakes, some may have been missed, and remained in the body of the document. If questions arise, please contact our department.    Dr. Martina Delgado, Infectious Disease Specialist  [phone]  June 19, 2024  3:18 PM

## 2024-06-19 NOTE — PROGRESS NOTES
General Surgery Consult    Alycia Clement  Admit date: 2024    MRN: [id number]     : 1955     Age: 69 y.o.        Attending Physician: Sang Avila MD [facility]      History of Present Illness:      Alycia Clement is a 69 y.o. male who we are following for evaluation of upper GI bleed secondary to duodenal ulcer.  The patient was extubated yesterday.  There is no evidence of any active bleeding.  He has no fever or tachycardia and his hemoglobin have dropped very slowly but probably nothing significant.  The patient was transferred to a regular floor from the ICU.    Patient Active Problem List    Diagnosis Date Noted    History of CVA (cerebrovascular accident) 2024    Hemorrhagic shock (HCC) 2024    Acute respiratory failure with hypoxia ([facility]) 2024    Goals of care, counseling/discussion 2024    Encounter for palliative care 2024    Acute encephalopathy 2024    Acute blood loss anemia 2024    History of CVA with residual deficit 2024    History of creation of ventriculoperitoneal shunt 2024    Debility 2024    Septic shock due to urinary tract infection (HCC) 2024    LORETTA (acute kidney injury) ([facility]) 2022    High anion gap metabolic acidosis 2022    Acute respiratory failure with hypoxemia ([facility]) 2022    Neutrophilic leukocytosis 2022    Other acute kidney failure (HCC) 2022    Septic shock ([facility]) 2022    Hemiparesis affecting right side as late effect of stroke ([facility]) 2022    Recurrent major depressive disorder, in partial remission (HCC) 2022    BPH (benign prostatic hyperplasia) 2022    Other hyperlipidemia 2022    Urinary tract infection associated with indwelling urethral catheter (HCC) 2020    Prediabetes 2019    Chronic neck pain 2019    Constipation 2019    Environmental and seasonal allergies 2019    Urine retention 2019    Sleep  (TYLENOL) suppository 650 mg  650 mg Rectal Q6H PRN      No Known Allergies       Review of Systems:  Review of systems not obtained due to patient factors.    Objective:     BP (!) 145/66   Pulse 70   Temp 98.1 °F (36.7 °C) (Oral)   Resp 30   Ht 1.778 m (5' 10\")   Wt 77.6 kg (171 lb)   SpO2 98%   BMI 24.54 kg/m²     Physical Exam:      General:  in no apparent distress   Eyes:  conjunctivae and sclerae normal, pupils equal, round, reactive to light   Throat & Neck: normal, no erythema or exudates noted. , and no palpable masses   Lungs:   clear to auscultation bilaterally   Heart:  Regular rate and rhythm   Abdomen:   flat, soft, nontender, nondistended, no masses or organomegaly,    Extremities: extremities normal, atraumatic, no cyanosis or edema   Skin: Normal.       Imaging and Lab Review:     CBC:   Lab Results   Component Value Date/Time    WBC 10.8 06/19/2024 05:14 AM    RBC 2.80 06/19/2024 05:14 AM    HGB 8.1 06/19/2024 05:14 AM    HCT 25.3 06/19/2024 05:14 AM     06/19/2024 05:14 AM     BMP:   Lab Results   Component Value Date/Time     06/19/2024 05:14 AM    K 3.6 06/19/2024 05:14 AM     06/19/2024 05:14 AM    CO2 18 06/19/2024 05:14 AM    BUN 19 06/19/2024 05:14 AM     CMP:  Lab Results   Component Value Date/Time     06/19/2024 05:14 AM    K 3.6 06/19/2024 05:14 AM     06/19/2024 05:14 AM    CO2 18 06/19/2024 05:14 AM    BUN 19 06/19/2024 05:14 AM    GLOB 4.6 06/12/2024 11:50 AM       Recent Results (from the past 24 hour(s))   Hemoglobin and Hematocrit    Collection Time: 06/18/24 11:30 AM   Result Value Ref Range    Hemoglobin 8.4 (L) 13.0 - 16.0 g/dL    Hematocrit 25.7 (L) 36.0 - 48.0 %   POCT Glucose    Collection Time: 06/18/24 11:45 AM   Result Value Ref Range    POC Glucose 105 70 - 110 mg/dL   Hemoglobin and Hematocrit    Collection Time: 06/18/24  5:14 PM   Result Value Ref Range    Hemoglobin 8.6 (L) 13.0 - 16.0 g/dL    Hematocrit 27.3 (L) 36.0 - 48.0 %

## 2024-06-19 NOTE — PLAN OF CARE
Problem: Discharge Planning  Goal: Discharge to home or other facility with appropriate resources  Outcome: Progressing     Problem: Safety - Adult  Goal: Free from fall injury  Outcome: Progressing     Problem: Pain  Goal: Verbalizes/displays adequate comfort level or baseline comfort level  Outcome: Progressing     Problem: ABCDS Injury Assessment  Goal: Absence of physical injury  Outcome: Progressing     Problem: Skin/Tissue Integrity  Goal: Absence of new skin breakdown  Description: 1.  Monitor for areas of redness and/or skin breakdown  2.  Assess vascular access sites hourly  3.  Every 4-6 hours minimum:  Change oxygen saturation probe site  4.  Every 4-6 hours:  If on nasal continuous positive airway pressure, respiratory therapy assess nares and determine need for appliance change or resting period.  Outcome: Progressing     Problem: Nutrition Deficit:  Goal: Optimize nutritional status  Outcome: Progressing

## 2024-06-20 PROBLEM — R47.01 APHASIA: Status: ACTIVE | Noted: 2023-11-13

## 2024-06-20 PROBLEM — E87.20 LACTIC ACIDEMIA: Status: ACTIVE | Noted: 2024-06-20

## 2024-06-20 PROBLEM — N40.1 BENIGN PROSTATIC HYPERPLASIA WITH LOWER URINARY TRACT SYMPTOMS: Status: ACTIVE | Noted: 2022-01-13

## 2024-06-20 PROBLEM — G81.91 HEMIPLEGIA, UNSPECIFIED AFFECTING RIGHT DOMINANT SIDE (HCC): Status: ACTIVE | Noted: 2023-11-13

## 2024-06-20 PROBLEM — I70.0 ATHEROSCLEROSIS OF AORTA (HCC): Status: ACTIVE | Noted: 2023-11-08

## 2024-06-20 PROBLEM — I63.9 CEREBRAL INFARCTION, UNSPECIFIED (HCC): Status: ACTIVE | Noted: 2023-11-13

## 2024-06-20 PROBLEM — J18.1 LOBAR PNEUMONIA, UNSPECIFIED ORGANISM (HCC): Status: ACTIVE | Noted: 2023-11-13

## 2024-06-20 PROBLEM — S12.600A CLOSED FRACTURE OF SEVENTH CERVICAL VERTEBRA (HCC): Status: ACTIVE | Noted: 2023-11-08

## 2024-06-20 PROBLEM — A41.9 SEPSIS, UNSPECIFIED ORGANISM (HCC): Status: ACTIVE | Noted: 2023-11-13

## 2024-06-20 PROBLEM — K92.2 UPPER GI BLEED: Status: ACTIVE | Noted: 2024-06-20

## 2024-06-20 PROBLEM — R41.82 ALTERED MENTAL STATUS: Status: ACTIVE | Noted: 2024-06-20

## 2024-06-20 LAB
FERRITIN SERPL-MCNC: 740 NG/ML (ref 8–388)
GLUCOSE BLD STRIP.AUTO-MCNC: 89 MG/DL (ref 70–110)
GLUCOSE BLD STRIP.AUTO-MCNC: 91 MG/DL (ref 70–110)
GLUCOSE BLD STRIP.AUTO-MCNC: 96 MG/DL (ref 70–110)
GLUCOSE BLD STRIP.AUTO-MCNC: 99 MG/DL (ref 70–110)
HCT VFR BLD AUTO: 26.3 % (ref 36–48)
HGB BLD-MCNC: 8.2 G/DL (ref 13–16)
IRON SATN MFR SERPL: 12 % (ref 20–50)
IRON SERPL-MCNC: 24 UG/DL (ref 50–175)
TIBC SERPL-MCNC: 200 UG/DL (ref 250–450)

## 2024-06-20 PROCEDURE — 82728 ASSAY OF FERRITIN: CPT

## 2024-06-20 PROCEDURE — C9113 INJ PANTOPRAZOLE SODIUM, VIA: HCPCS | Performed by: STUDENT IN AN ORGANIZED HEALTH CARE EDUCATION/TRAINING PROGRAM

## 2024-06-20 PROCEDURE — 92526 ORAL FUNCTION THERAPY: CPT

## 2024-06-20 PROCEDURE — 6370000000 HC RX 637 (ALT 250 FOR IP): Performed by: STUDENT IN AN ORGANIZED HEALTH CARE EDUCATION/TRAINING PROGRAM

## 2024-06-20 PROCEDURE — 6360000002 HC RX W HCPCS: Performed by: STUDENT IN AN ORGANIZED HEALTH CARE EDUCATION/TRAINING PROGRAM

## 2024-06-20 PROCEDURE — 85018 HEMOGLOBIN: CPT

## 2024-06-20 PROCEDURE — 6370000000 HC RX 637 (ALT 250 FOR IP): Performed by: INTERNAL MEDICINE

## 2024-06-20 PROCEDURE — 99232 SBSQ HOSP IP/OBS MODERATE 35: CPT | Performed by: SURGERY

## 2024-06-20 PROCEDURE — 82962 GLUCOSE BLOOD TEST: CPT

## 2024-06-20 PROCEDURE — 6360000002 HC RX W HCPCS: Performed by: INTERNAL MEDICINE

## 2024-06-20 PROCEDURE — 2580000003 HC RX 258: Performed by: STUDENT IN AN ORGANIZED HEALTH CARE EDUCATION/TRAINING PROGRAM

## 2024-06-20 PROCEDURE — 99232 SBSQ HOSP IP/OBS MODERATE 35: CPT | Performed by: STUDENT IN AN ORGANIZED HEALTH CARE EDUCATION/TRAINING PROGRAM

## 2024-06-20 PROCEDURE — 85014 HEMATOCRIT: CPT

## 2024-06-20 PROCEDURE — 83540 ASSAY OF IRON: CPT

## 2024-06-20 PROCEDURE — 36415 COLL VENOUS BLD VENIPUNCTURE: CPT

## 2024-06-20 PROCEDURE — 51702 INSERT TEMP BLADDER CATH: CPT

## 2024-06-20 PROCEDURE — 2580000003 HC RX 258: Performed by: INTERNAL MEDICINE

## 2024-06-20 PROCEDURE — 2140000001 HC CVICU INTERMEDIATE R&B

## 2024-06-20 PROCEDURE — 2580000003 HC RX 258: Performed by: PHYSICIAN ASSISTANT

## 2024-06-20 PROCEDURE — 83550 IRON BINDING TEST: CPT

## 2024-06-20 PROCEDURE — 99232 SBSQ HOSP IP/OBS MODERATE 35: CPT | Performed by: NURSE PRACTITIONER

## 2024-06-20 RX ORDER — SENNA AND DOCUSATE SODIUM 50; 8.6 MG/1; MG/1
1 TABLET, FILM COATED ORAL 2 TIMES DAILY
Status: DISCONTINUED | OUTPATIENT
Start: 2024-06-20 | End: 2024-06-21 | Stop reason: HOSPADM

## 2024-06-20 RX ORDER — TAMSULOSIN HYDROCHLORIDE 0.4 MG/1
0.8 CAPSULE ORAL DAILY
Status: DISCONTINUED | OUTPATIENT
Start: 2024-06-20 | End: 2024-06-21 | Stop reason: HOSPADM

## 2024-06-20 RX ORDER — LOPERAMIDE HYDROCHLORIDE 2 MG/1
2 CAPSULE ORAL EVERY 6 HOURS PRN
COMMUNITY
Start: 2024-05-14

## 2024-06-20 RX ORDER — MIDODRINE HYDROCHLORIDE 5 MG/1
5 TABLET ORAL EVERY 8 HOURS
COMMUNITY
Start: 2024-05-14

## 2024-06-20 RX ADMIN — SODIUM BICARBONATE 650 MG: 650 TABLET ORAL at 21:41

## 2024-06-20 RX ADMIN — SODIUM CHLORIDE, PRESERVATIVE FREE 10 ML: 5 INJECTION INTRAVENOUS at 21:41

## 2024-06-20 RX ADMIN — PANTOPRAZOLE SODIUM 40 MG: 40 INJECTION, POWDER, FOR SOLUTION INTRAVENOUS at 08:06

## 2024-06-20 RX ADMIN — SODIUM BICARBONATE 650 MG: 650 TABLET ORAL at 14:05

## 2024-06-20 RX ADMIN — SODIUM BICARBONATE 650 MG: 650 TABLET ORAL at 08:07

## 2024-06-20 RX ADMIN — SODIUM CHLORIDE, PRESERVATIVE FREE 10 ML: 5 INJECTION INTRAVENOUS at 08:07

## 2024-06-20 RX ADMIN — TAMSULOSIN HYDROCHLORIDE 0.8 MG: 0.4 CAPSULE ORAL at 11:15

## 2024-06-20 RX ADMIN — PANTOPRAZOLE SODIUM 40 MG: 40 INJECTION, POWDER, FOR SOLUTION INTRAVENOUS at 21:40

## 2024-06-20 RX ADMIN — SENNOSIDES AND DOCUSATE SODIUM 1 TABLET: 50; 8.6 TABLET ORAL at 11:15

## 2024-06-20 RX ADMIN — WATER 2000 MG: 1 INJECTION INTRAMUSCULAR; INTRAVENOUS; SUBCUTANEOUS at 21:40

## 2024-06-20 ASSESSMENT — PAIN SCALES - GENERAL
PAINLEVEL_OUTOF10: 0

## 2024-06-20 NOTE — PROGRESS NOTES
Palliative Medicine   [facility] [phone]  [facility] [phone]    CODE STATUS:  FULL CODE / FULL AGGRESSIVE MEASURES    AMD STATUS:  PT's children are jointly legal next of kin/surrogate decision makers.    Pt's case discussed in palliative rounds.  Palliative team attended to pt at bedside for follow up visit. Pt laying in bed with head elevated, sleeping, upon entry.  Pt awakened to verbal stimuli and gave OK sign with hand, when asked about how he's doing. Pt ate very well for breakfast, per observation of pt's breakfast tray.     This LMSW attempted to contact pt's daughter, Jade Smiley, at [phone]; left VM requesting return call. Plan:  Provide supportive counseling, address goals of care.     This LMSW received return call from pt's daughter, Jade Smiley.  LMSW provided supportive counseling, addressed whether or not family has made decision regarding goals of care. Pt's daughter reports she did talk to her brother about pt's condition, but they did not directly address goals of care.  LMSW explained importance of making decisions that are in the best interest of pt based on whether or not the treatments he is receiving are more beneficial or more burdensome to pt.  LMSW offered to arrange family meeting to discuss this information with both her and her brother together.  She reports it's very difficult for them to get together due to work schedules. She said she thinks they both have Monday off. LMSW informed her, if she and her brother want to have a meeting Monday and pt is still here, we can definitely set a time to meet.  She reports she will let us know.  No futher questions or concerns at this time.     Thank you for this referral to Palliative Care. The palliative care team remains available to provide support to patient and their family.     Alison Lal, MALLORIE, APHSW-C  Palliative Medicine Inpatient   [facility]

## 2024-06-20 NOTE — CARE COORDINATION
Call received from Ms. Deleon from [employer] direct, inquiring on patient status, JENAE and offers assistance with DCP if needed. Her POC is Direct : [phone] weekends, call [phone].

## 2024-06-20 NOTE — PROGRESS NOTES
0735: Bedside shift change report given to BRITANY Ty (oncoming nurse) by BRITANY Dexter (offgoing nurse). Report included the following information Nurse Handoff Report, Adult Overview, Intake/Output, MAR, and Cardiac Rhythm NSR .     1920: Bedside shift change report given to BRITANY Dexter (oncoming nurse) by BRITANY Ty (offgoing nurse). Report included the following information Nurse Handoff Report, Adult Overview, Intake/Output, MAR, and Cardiac Rhythm NSR .

## 2024-06-20 NOTE — PROGRESS NOTES
In Patient Progress note      Admit Date: 6/12/2024      Impression:     1) LORETTA on CKD3a  baseline creat 1.1-1.5  at baseline now   2) Etiology prerenal azotemia -> resolved   3) Hypernatremia - resolved  4) Hypokalemia , replete   5) Metabolic acidoses  6) Bradycardia resolved     Plan:  1) encourage po intake, no labs today but doing well   2) increase fluid intake   3) avoid NSAIds , IV contrast  4) renally dose AB for egfr < 30  5) continue  bicarb for acidoses      Will sign off   Doing well from renal stand point  Please call with questions     Santana Earl MD FASN  Cell [phone]  Pager: [phone]  Fax   [phone]     Subjective:     - No acute over night events.    Objective:     BP (!) 119/57   Pulse 61   Temp 98.5 °F (36.9 °C) (Axillary)   Resp 20   Ht 1.778 m (5' 10\")   Wt 77.6 kg (171 lb)   SpO2 99%   BMI 24.54 kg/m²       Intake/Output Summary (Last 24 hours) at 6/20/2024 0908  Last data filed at 6/20/2024 0730  Gross per 24 hour   Intake --   Output 1050 ml   Net -1050 ml         Physical Exam:     Gen NAD  HENT mmm  RS AEBE   CVS s1s2 wnl no JVD  Ext edema +  Skin no rashe    Data Review:    Recent Labs     06/19/24  0514 06/19/24  2120 06/20/24  0455   WBC 10.8  --   --    RBC 2.80*  --   --    HCT 25.3*   < > 26.3*   MCV 90.4  --   --    MCH 28.9  --   --    MCHC 32.0  --   --    RDW 15.9*  --   --    MPV 11.1  --   --     < > = values in this interval not displayed.       Recent Labs     06/17/24  1200 06/18/24  0214 06/19/24  0514   BUN 35* 30* 19*   K 3.2* 3.7 3.6   * 145 142   * 120* 118*   CO2 20* 21 18*   PHOS  --  2.7 3.1         Santana Earl MD

## 2024-06-20 NOTE — PROGRESS NOTES
[email or web address]  [phone]    Gastroenterology follow up-Progress note    Impression:  1. Acute on chronic anemia  - H/H currently 8.2/26.3 s/p 3 units PRBC  - large high risk gastric ulcer noted on EGD 6/14  - 6/18/24: pt had large melenic stool NOC, Hgb 6.8 on 6/18  -EGD 6/14/2024-large amount of blood in stomach-fundus could not be cleared; 2 cm deep proximal duodenal bulb ulcer with overlying clot and high risk stigmata-not amenable to endoscopic therapy.  - past history of PUD w/ EGD 11/2022 w/ non-bleeding gastric ulcer w/ no signs of recent bleed, duodenitis, gastric bx: reactive gastropathy gastric ulcer: focally denuded mucosa with chronic inflammation  2. Septic Shock  - complicated UTI w/ hx chronic indwelling friedman  - recent [facility] for same    Plan:  1. Continue pantoprazole 40 mg BID  2. Monitor H/H and transfuse as per protocol  3. Check ferritin/iron profile and replace iron as indicated  4. If signs of active ongoing bleeding, recommend IR embolization of Gastroduodenal artery   5. Continue medical management as per primary team  6. Will sign off-Thank you for this consultation and the opportunity to participate in the care of this patient. Please do not hesitate to call with any questions or concerns, or should event occur that may necessitate additional GI evaluation.      Chief Complaint: anemia      Subjective:  No further bleeding, denies abdominal pain, tolerating applesauce po, no nausea/vomiting    ROS: Denies any fevers, chills, rash.     Eyes: conjunctiva normal, EOM normal   Neck: ROM normal, supple and trachea normal   Cardiovascular: heart normal, normal rate and regular rhythm   Pulmonary/Chest Wall: breath sounds normal and effort normal   Abdominal: Nondistended, bowel sounds normal and soft, non-acute, non-tender     Patient Active Problem List   Diagnosis    Urine retention    Urinary tract infection, site not specified    Acute cystitis without

## 2024-06-20 NOTE — PROGRESS NOTES
Medical History:   Diagnosis Date    LORETTA (acute kidney injury) (Formerly [facility])     Back pain     Bimalleolar fracture of right ankle 07/25/2018    CKD (chronic kidney disease)     Closed fracture of right ankle 11/26/2018    Essential hypertension 12/11/2015    Gastroesophageal reflux disease without esophagitis 12/11/2015    History of hemorrhagic stroke with residual hemiparesis (Formerly [facility]) 09/21/2014    Non-adherence to medical treatment     Non-adherence to medical treatment     Potassium (K) deficiency 11/26/2018    Sepsis (Formerly [facility])     Urinary retention     Urinary tract infection without hematuria     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 MAIN OR [facility];  Service: Neurosurgery;  Laterality: Right;  VENTRICULOPERITONEAL SHUNT INSERTION    OTHER SURGICAL HISTORY      Pr place percut gastrostomy    OTHER SURGICAL HISTORY Right 10/09/2014    Ventriculoperitoneal Shunt Insertion -- Dr Alycia Dumas ([facility])    OTHER SURGICAL HISTORY N/A 10/16/2014    Endoscopic Percutaneous Gastronomy Tube insertion -- Dr Juan Antonio Thompson ([facility])    UPPER GASTROINTESTINAL ENDOSCOPY N/A 6/14/2024    ESOPHAGOGASTRODUODENOSCOPY performed by Jade Witt MD at [facility] ENDOSCOPY       [email or web address]    Current Facility-Administered Medications   Medication Dose Route Frequency    sodium bicarbonate tablet 650 mg  650 mg Oral TID    cefTRIAXone (ROCEPHIN) 2,000 mg in sterile water 20 mL IV syringe  2,000 mg IntraVENous Q24H    0.9 % sodium chloride infusion   IntraVENous PRN    pantoprazole (PROTONIX) 40 mg in sodium chloride (PF) 0.9 % 10 mL injection  40 mg IntraVENous Q12H    glucose chewable tablet 16 g  4 tablet Oral PRN    dextrose bolus 10% 125 mL  125 mL IntraVENous PRN    Or    dextrose bolus 10% 250 mL  250 mL IntraVENous PRN    Glucagon Emergency SOLR  ug/mL Sensitive      piperacillin-tazobactam <=4 ug/mL Sensitive      tobramycin <=1 ug/mL Sensitive      trimethoprim-sulfamethoxazole <=20 ug/mL Sensitive                           COVID-19 & Influenza Combo [[id number]] Collected: 06/12/24 1300    Order Status: Completed Specimen: Nasopharyngeal Updated: 06/12/24 1341     SARS-CoV-2, PCR Not detected        Comment: Not Detected results do not preclude SARS-CoV-2 infection and should not be used as the sole basis for patient management decisions.Results must be combined with clinical observations, patient history, and epidemiological information.        Rapid Influenza A By PCR Not detected        Rapid Influenza B By PCR Not detected        Comment: Testing was performed using robin Shraddha SARS-CoV-2 and Influenza A/B nucleic acid assay.  This test is a multiplex Real-Time Reverse Transcriptase Polymerase Chain Reaction (RT-PCR) based in vitro diagnostic test intended for the qualitative detection of nucleic acids from SARS-CoV-2, Influenza A, and Influenza B in nasopharyngeal for use under the FDA's Emergency Use Authorization(EAU) only.     Fact sheet for Patients: https://www.fda.gov/media/951187/download  Fact sheet for Healthcare Providers: https://www.fda.fov/media/694188/download         Blood Culture 2 [[id number]] Collected: 06/12/24 1200    Order Status: Completed Specimen: Blood Updated: 06/18/24 0742     Special Requests NO SPECIAL REQUESTS        Culture NO GROWTH 6 DAYS       Blood Culture 1 [[id number]] Collected: 06/12/24 1150    Order Status: Completed Specimen: Blood Updated: 06/18/24 0742     Special Requests NO SPECIAL REQUESTS        Culture NO GROWTH 6 DAYS                   RADIOLOGY:    All available imaging studies/reports in [facility] for this admission were reviewed      Disclaimer: Sections of this note are dictated utilizing voice recognition software, which may have resulted in some phonetic based errors in grammar and contents.

## 2024-06-20 NOTE — PROGRESS NOTES
[facility] Hospitalist Group  Transfer acceptance note    Patient: Alycia Clement Age: 69 y.o. : 1955 MR#: [id number] SSN: [id number]  Date/Time: 2024    Subjective:   Subjective   No acute events overnight, no new concerns or complaints, vitals stable.  Overall patient is remained stable    Review of Systems   Constitutional: Negative for fever.      Assessment/Plan:   Shock  Septic shock  Complicated UTI  Hemorrhagic shock  Acute hypoxic respiratory failure  Chronic indwelling Malin  Acute GI bleed  Acute blood loss anemia on chronic anemia of chronic disease  Acute encephalopathy  Hyponatremia  Hypokalemia  Hyperchloremia  Hypophosphatemia  LORETTA on CKD 3  NG MA  BPH  History of hemorrhagic stroke with  shunt  Pseudohypoglycemia  Mild hypernatremia      Plan  Change in antibiotics per infectious disease, appreciate their assistance as always  No clear signs of bleeding, mild drop in hemoglobin since last measurement, continue to trend    Disposition: Expected location: Long-term care    Expected timeframe: One day until discharge       Case discussed with:  [x]Patient  [x]Family  [x]Nursing  [x]Case Management  DVT Prophylaxis:  []Lovenox  []Hep SQ  [x]SCDs  []Coumadin   []On Heparin gtt   [] DOAC    Objective:   Objective:  General Appearance:  Comfortable, well-appearing, in no acute distress and not in pain.    Vital signs: (most recent): Blood pressure (!) 119/57, pulse 61, temperature 98.5 °F (36.9 °C), temperature source Axillary, resp. rate 20, height 1.778 m (5' 10\"), weight 77.6 kg (171 lb), SpO2 99 %.  Vital signs are normal.  No fever.    HEENT: Normal HEENT exam.    Lungs:  Normal effort and normal respiratory rate.  Breath sounds clear to auscultation.    Heart: Normal rate.  Regular rhythm.  S1 normal and S2 normal.  No murmur, gallop or friction rub.   Chest: Symmetric chest wall expansion.   Abdomen: Abdomen is soft and non-distended.  Bowel sounds are normal.

## 2024-06-20 NOTE — PLAN OF CARE
Problem: SLP Adult - Impaired Swallowing  Goal: By Discharge: Advance to least restrictive diet without signs or symptoms of aspiration for planned discharge setting.  See evaluation for individualized goals.  Description: Patient will:  1. Tolerate PO trials with 0 s/s overt distress in 4/5 trials  2. Utilize compensatory swallow strategies/maneuvers (decrease bite/sip, size/rate, alt. liq/sol) with min cues in 4/5 trials  3. Perform oral-motor/laryngeal exercises to increase oropharyngeal swallow function with min cues  4. Complete an objective swallow study (i.e., MBSS) to assess swallow integrity, r/o aspiration, and determine of safest LRD, min A as indicated/ordered by MD     Rec:     Puree diet with thin liquids  Aspiration precautions  HOB >45 during po intake, remain >30 for 30-45 minutes after po   Small bites/sips; alternate liquid/solid with slow feeding rate   Oral care TID  Meds per pt preference      Outcome: Progressing    SPEECH LANGUAGE PATHOLOGY DYSPHAGIA TREATMENT    Patient: Alycia Clement (69 y.o. male)  Date: 6/20/2024  Diagnosis: Septic shock (HCC) [A41.9, R65.21] Septic shock (HCC)  Procedure(s) (LRB):  ESOPHAGOGASTRODUODENOSCOPY (N/A) 6 Days Post-Op  Precautions: Standard, aspiration  PLOF: As per H&P      ASSESSMENT:  Pt seen at bedside for follow up dysphagia management. Pt alert and accepting of PO. Pt tolerated puree solids, mildly thick liquids, and thin liquids without any overt s/sx of aspiration with all PO trials. Laryngeal elevation was weak/decreased to palpation. Positive oral clearance with all PO. Pt reported that he eats puree foods at baseline and wants to continue for ease of mastication. Recommend puree solids and thin liquids, aspiration precautions, oral care TID, and meds as tolerated. Suspect Pt is currently tolerating least restricted diet will continue to follow x1 to ensure diet tolerance. D/w RN.     Progression toward goals:  [x]         Improving appropriately  with interdisciplinary staff   []            Handout regarding diet recommendations and thickener instructions provided.  [x]         Posted safety precautions in patient's room.    Thank you for this referral,     Germaine Gonzalez  Speech Pathology Student    Jade Lucia M.S. CCC-SLP  Speech Language Pathologist

## 2024-06-20 NOTE — PROGRESS NOTES
Palliative Medicine Initial Consult  Patient Name: Alycia Clement  YOB: 1955  MRN: [id number]  Age: 69 y.o.  Gender: male    Date of Initial Consult: 6/13/2024  Date of Service: 6/20/2024  Time: 3:52 PM  Provider: RONI Purcell NP  Hospital Day: 9  Admit Date: 6/12/2024  Referring Provider: Juany RICHTER       Reasons for Consultation:  Goals of Care    HISTORY OF PRESENT ILLNESS (HPI):   Alycia Clement is a 69 y.o. male with a past medical history of hemorrhagic stroke with right hemiparesis,  shunt placement in 2014, hypertension, chronic kidney disease, chronic friedman catheter, sepsis related to urinary tract infections, who was admitted on 6/12/2024 from [facility] with a diagnosis of septic shock related to UTI.  The patient was at his adult  of which he attends twice weekly. He resides at [facility]. He was \"more altered than normal\" per notes.  Per chart review the patient was recently discharged from [facility] in May 2024 for severe sepsis and septic shock related to a UTI.  Palliative care was consulted for goals of care.    Psychosocial: The patient lives at [facility].  He is a patient of the [facility] program and attends adult  about twice a week per daughter.  He is not , he has 2 daughters and 1 son.  He has a sister named Margarita Clement.  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    6/20/2024 Mr Clement seen at bedside. He will easily alert to his name. Ate all of his breakfast. Gave us the \" OK\" sign for how he is feeling. Denies pain.     6/18/2024 Mr Clement is now medically extubated, alert confused, denies pain.     6/13/2024 Patient seen in ICU. Daughter was at the bedside.  He opened his eyes but then quickly closed them.  Bedside nurse was in the room.  Patient intubated, on multiple vasopressor support and sedated with IV fentanyl.

## 2024-06-20 NOTE — PLAN OF CARE
Problem: Discharge Planning  Goal: Discharge to home or other facility with appropriate resources  6/20/2024 0805 by Antonio Ronquillo RN  Outcome: Progressing  Flowsheets (Taken 6/20/2024 0730 by Melony Saeed RN)  Discharge to home or other facility with appropriate resources:   Identify barriers to discharge with patient and caregiver   Arrange for needed discharge resources and transportation as appropriate     Problem: Safety - Adult  Goal: Free from fall injury  6/20/2024 0805 by Antonio Ronquillo RN  Outcome: Progressing     Problem: Pain  Goal: Verbalizes/displays adequate comfort level or baseline comfort level  6/20/2024 0805 by Antonio Ronquillo RN  Outcome: Progressing     Problem: ABCDS Injury Assessment  Goal: Absence of physical injury  6/20/2024 0805 by Atnonio Ronquillo RN  Outcome: Progressing     Problem: Skin/Tissue Integrity  Goal: Absence of new skin breakdown  Description: 1.  Monitor for areas of redness and/or skin breakdown  2.  Assess vascular access sites hourly  3.  Every 4-6 hours minimum:  Change oxygen saturation probe site  4.  Every 4-6 hours:  If on nasal continuous positive airway pressure, respiratory therapy assess nares and determine need for appliance change or resting period.  6/20/2024 0805 by Antonio Ronquillo RN  Outcome: Progressing     Problem: Nutrition Deficit:  Goal: Optimize nutritional status  6/20/2024 0805 by Antonio Ronquillo RN  Outcome: Progressing

## 2024-06-20 NOTE — PROGRESS NOTES
General Surgery Consult    Alycia Clement  Admit date: 2024    MRN: [id number]     : 1955     Age: 69 y.o.        Attending Physician: Sang Avila MD [facility]      History of Present Illness:      Alycia Clement is a 69 y.o. male who we are following for evaluation of upper GI bleed secondary to duodenal ulcer.  The patient is currently doing well and his hemoglobin hematocrit has been stable with no evidence of active bleeding.    Patient Active Problem List    Diagnosis Date Noted    History of CVA (cerebrovascular accident) 2024    Hemorrhagic shock (HCC) 2024    Acute respiratory failure with hypoxia ([facility]) 2024    Goals of care, counseling/discussion 2024    Encounter for palliative care 2024    Acute encephalopathy 2024    Acute blood loss anemia 2024    History of CVA with residual deficit 2024    History of creation of ventriculoperitoneal shunt 2024    Debility 2024    Septic shock due to urinary tract infection ([facility]) 2024    LORETTA (acute kidney injury) ([facility]) 2022    High anion gap metabolic acidosis 2022    Acute respiratory failure with hypoxemia ([facility]) 2022    Neutrophilic leukocytosis 2022    Other acute kidney failure (HCC) 2022    Septic shock ([facility]) 2022    Hemiparesis affecting right side as late effect of stroke ([facility]) 2022    Recurrent major depressive disorder, in partial remission ([facility]) 2022    BPH (benign prostatic hyperplasia) 2022    Other hyperlipidemia 2022    Urinary tract infection associated with indwelling urethral catheter ([facility]) 2020    Prediabetes 2019    Chronic neck pain 2019    Constipation 2019    Environmental and seasonal allergies 2019    Urine retention 2019    Sleep difficulties 2018    Acute cystitis without hematuria 2018    Backache 2017    Vitamin D deficiency 2016    Anemia  Relation Age of Onset    Stroke Brother     Hypertension Mother     Stroke Mother     Diabetes Sister     Hypertension Sister     Diabetes Brother     Diabetes Mother     Hypertension Brother       Current Facility-Administered Medications   Medication Dose Route Frequency    sodium bicarbonate tablet 650 mg  650 mg Oral TID    cefTRIAXone (ROCEPHIN) 2,000 mg in sterile water 20 mL IV syringe  2,000 mg IntraVENous Q24H    0.9 % sodium chloride infusion   IntraVENous PRN    pantoprazole (PROTONIX) 40 mg in sodium chloride (PF) 0.9 % 10 mL injection  40 mg IntraVENous Q12H    glucose chewable tablet 16 g  4 tablet Oral PRN    dextrose bolus 10% 125 mL  125 mL IntraVENous PRN    Or    dextrose bolus 10% 250 mL  250 mL IntraVENous PRN    Glucagon Emergency SOLR 1 mg  1 mg SubCUTAneous PRN    dextrose 10 % infusion   IntraVENous Continuous PRN    insulin lispro (HUMALOG,ADMELOG) injection vial 0-4 Units  0-4 Units SubCUTAneous Q6H    0.9 % sodium chloride infusion   IntraVENous PRN    sodium chloride flush 0.9 % injection 5-40 mL  5-40 mL IntraVENous 2 times per day    sodium chloride flush 0.9 % injection 5-40 mL  5-40 mL IntraVENous PRN    0.9 % sodium chloride infusion   IntraVENous PRN    potassium chloride (KLOR-CON M) extended release tablet 40 mEq  40 mEq Oral PRN    Or    potassium bicarb-citric acid (EFFER-K) effervescent tablet 40 mEq  40 mEq Oral PRN    Or    potassium chloride 10 mEq/100 mL IVPB (Peripheral Line)  10 mEq IntraVENous PRN    magnesium sulfate 2000 mg in 50 mL IVPB premix  2,000 mg IntraVENous PRN    ondansetron (ZOFRAN-ODT) disintegrating tablet 4 mg  4 mg Oral Q8H PRN    Or    ondansetron (ZOFRAN) injection 4 mg  4 mg IntraVENous Q6H PRN    acetaminophen (TYLENOL) tablet 650 mg  650 mg Oral Q6H PRN    Or    acetaminophen (TYLENOL) suppository 650 mg  650 mg Rectal Q6H PRN      No Known Allergies       Review of Systems:  Review of systems not obtained due to patient factors.    Objective:

## 2024-06-21 VITALS
TEMPERATURE: 97.7 F | SYSTOLIC BLOOD PRESSURE: 106 MMHG | RESPIRATION RATE: 18 BRPM | BODY MASS INDEX: 24.48 KG/M2 | WEIGHT: 171 LBS | DIASTOLIC BLOOD PRESSURE: 62 MMHG | HEART RATE: 80 BPM | HEIGHT: 70 IN | OXYGEN SATURATION: 98 %

## 2024-06-21 LAB
GLUCOSE BLD STRIP.AUTO-MCNC: 101 MG/DL (ref 70–110)
GLUCOSE BLD STRIP.AUTO-MCNC: 86 MG/DL (ref 70–110)
GLUCOSE BLD STRIP.AUTO-MCNC: 94 MG/DL (ref 70–110)

## 2024-06-21 PROCEDURE — 6370000000 HC RX 637 (ALT 250 FOR IP): Performed by: INTERNAL MEDICINE

## 2024-06-21 PROCEDURE — C9113 INJ PANTOPRAZOLE SODIUM, VIA: HCPCS | Performed by: STUDENT IN AN ORGANIZED HEALTH CARE EDUCATION/TRAINING PROGRAM

## 2024-06-21 PROCEDURE — 6370000000 HC RX 637 (ALT 250 FOR IP): Performed by: STUDENT IN AN ORGANIZED HEALTH CARE EDUCATION/TRAINING PROGRAM

## 2024-06-21 PROCEDURE — 6360000002 HC RX W HCPCS: Performed by: STUDENT IN AN ORGANIZED HEALTH CARE EDUCATION/TRAINING PROGRAM

## 2024-06-21 PROCEDURE — 2580000003 HC RX 258: Performed by: PHYSICIAN ASSISTANT

## 2024-06-21 PROCEDURE — 82962 GLUCOSE BLOOD TEST: CPT

## 2024-06-21 PROCEDURE — 51702 INSERT TEMP BLADDER CATH: CPT

## 2024-06-21 PROCEDURE — 99239 HOSP IP/OBS DSCHRG MGMT >30: CPT | Performed by: STUDENT IN AN ORGANIZED HEALTH CARE EDUCATION/TRAINING PROGRAM

## 2024-06-21 PROCEDURE — 94761 N-INVAS EAR/PLS OXIMETRY MLT: CPT

## 2024-06-21 PROCEDURE — 2580000003 HC RX 258: Performed by: STUDENT IN AN ORGANIZED HEALTH CARE EDUCATION/TRAINING PROGRAM

## 2024-06-21 RX ORDER — SODIUM BICARBONATE 650 MG/1
650 TABLET ORAL 3 TIMES DAILY
Qty: 90 TABLET | Refills: 0 | Status: SHIPPED | OUTPATIENT
Start: 2024-06-21 | End: 2024-07-21

## 2024-06-21 RX ORDER — PANTOPRAZOLE SODIUM 40 MG/1
40 TABLET, DELAYED RELEASE ORAL 2 TIMES DAILY
Qty: 30 TABLET | Refills: 3 | Status: SHIPPED | OUTPATIENT
Start: 2024-06-21

## 2024-06-21 RX ADMIN — SENNOSIDES AND DOCUSATE SODIUM 1 TABLET: 50; 8.6 TABLET ORAL at 09:01

## 2024-06-21 RX ADMIN — PANTOPRAZOLE SODIUM 40 MG: 40 INJECTION, POWDER, FOR SOLUTION INTRAVENOUS at 09:01

## 2024-06-21 RX ADMIN — SODIUM BICARBONATE 650 MG: 650 TABLET ORAL at 15:25

## 2024-06-21 RX ADMIN — SODIUM BICARBONATE 650 MG: 650 TABLET ORAL at 09:01

## 2024-06-21 RX ADMIN — SODIUM CHLORIDE, PRESERVATIVE FREE 10 ML: 5 INJECTION INTRAVENOUS at 09:03

## 2024-06-21 RX ADMIN — TAMSULOSIN HYDROCHLORIDE 0.8 MG: 0.4 CAPSULE ORAL at 09:01

## 2024-06-21 ASSESSMENT — PAIN SCALES - GENERAL
PAINLEVEL_OUTOF10: 0

## 2024-06-21 NOTE — PROGRESS NOTES
Comprehensive Nutrition Assessment      Type and Reason for Visit:  Reassess, Consult, NPO/Clear Liquid    Nutrition Recommendations/Plan:   Feeding assistant with ALL Meals,   Continue current diet and oral nutrition supplements: Magic Cup (each provides 290 kcal, 9g protein) TID  Continue to monitor tolerance of PO, compliance of oral supplements, weight, labs, and plan of care during admission.         Malnutrition Assessment:  Malnutrition Status:  Moderate malnutrition (06/21/24 1310)    Context:  Acute Illness     Findings of the 6 clinical characteristics of malnutrition:  Energy Intake:  Mild decrease in energy intake (Comment)  Weight Loss:  No significant weight loss     Body Fat Loss:  Mild body fat loss Orbital, Buccal region   Muscle Mass Loss:  Mild muscle mass loss Temples (temporalis)  Fluid Accumulation:  Mild Extremities (lower)   Strength:  Measurable reduction in  strength      Nutrition Assessment:    Admitted from NH for AMS.On 6/13, pt decompensated, emergently intubated. Nephrology following, LORETTA on CKD. Pt has been NPO since admit x 5 days due to GIB 2/2 large duodenal ulcer. TF initated 6/17 as OK by general surgery. Extubated in the evening of 6/17, TF canceled. Following by SLP, 6/20 rec'd puree solids and thin liquids diet. Per today visit, pt seemed weak. Minimal verbal. Observed breakfast tray upside down in trash bin - leif intake. Observed ONS at bedside untouched, RD helped open the supplement and pt able to eat it by himself but with some difficulty holding utensils. Plan to order feeding assistant with meal    Nutrition Related Findings:    Pertinent meds: PPI, Nabicarb. Labs reviewed.   Wound Type: None     Last BM (including prior to admit): 06/18/24  Edema: Right lower extremity, Left lower extremity    Current Nutrition Intake & Therapies:    Average Meal Intake: Unable to assess (x 1 meal)  Average Supplements Intake: Unable to assess  ADULT ORAL NUTRITION SUPPLEMENT;  Breakfast, Lunch, Dinner; Frozen Oral Supplement  ADULT DIET; Dysphagia - Pureed     Anthropometric Measures:  Height: 177.8 cm (5' 10\")  Ideal Body Weight (IBW): 166 lbs (75 kg)    Admission Body Weight: 77.6 kg (171 lb 1.2 oz)  Current Body Weight: 77.6 kg (171 lb 1.2 oz) (taken by RD), 103.1 % IBW. Weight Source: Bed Scale  Current BMI (kg/m2): 24.5   BMI Categories: Normal Weight (BMI 22.0 to 24.9) age over 65    Estimated Daily Nutrient Needs:  Energy Requirements Based On: Kcal/kg  Weight Used for Energy Requirements: Current  Energy (kcal/day): 1938 (25 kcal/kg)  Weight Used for Protein Requirements: Current  Protein (g/day): 78-93 (1-1.2 g/kg)  Method Used for Fluid Requirements: 1 ml/kcal  Fluid (ml/day): or per MD    Nutrition Diagnosis:   Predicted inadequate energy intake related to cognitive or neurological impairment (general weakness) as evidenced by reduced  strength (difficulty feeding self)  Moderate malnutrition, In context of acute illness or injury related to inadequate protein-energy intake as evidenced by Criteria as identified in malnutrition assessment    Nutrition Interventions:   Food and/or Nutrient Delivery: Continue Current Diet, Continue Oral Nutrition Supplement  Nutrition Education/Counseling: No recommendation at this time  Coordination of Nutrition Care: Feeding Assistance/Environment Change, Continue to monitor while inpatient  Plan of Care discussed with: interdisciplinary team    Goals:  Previous Goal Met: Progressing toward Goal(s)  Goals: Meet at least 75% of estimated needs, PO intake 75% or greater, by next RD assessment       Nutrition Monitoring and Evaluation:   Behavioral-Environmental Outcomes: None Identified  Food/Nutrient Intake Outcomes: Food and Nutrient Intake, Supplement Intake  Physical Signs/Symptoms Outcomes: Biochemical Data    Discharge Planning:    Continue Oral Nutrition Supplement     Rima Trotter RD  Contact: [phone]

## 2024-06-21 NOTE — CARE COORDINATION
Requested Case Management specialist to assist with transportation to [facility] at [address] [place] [address]   and phone number is [phone]   Patient will require BLS transport.   Pt requires Stretcher If stretcher, reason: impaired mobility. History of CVA, left sided weakness  Patient is currently requiring oxygen No   Height:5'10\"   Weight: 171  Pt is on isolation: No   Is the pt ready now? No   Requested time: 2:30 pm                                   PCS Faxed: No  Insurance verified on face sheet: Yes  Auth needed for transport: No  CM completed PCS/ Envelope and placed on chart.     Kathia Becerra BSW   Case Management

## 2024-06-21 NOTE — CARE COORDINATION
Patient is a [facility], [facility] uses [employer] transportation, [phone], spoke with Florence, will transport patient at 6:00 pm.

## 2024-06-21 NOTE — PROGRESS NOTES
Infectious Disease Progress Note        Reason:septic shock    Current abx Prior abx   Piperacillin/tazobactam 6/12;  since 6/13-6/19  Ceftriaxone since 6/19-6/20   Vancomycin 6/12-6/13     Lines:       Assessment :  69 y.o. 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     Hospitalization at [facility] in May 2024- High grade Enterobacter cloacae septicemia of unclear source  --Blood cultures:  --5/6: Enterobacter cloacae (S Cefepime ROGER <=1)  --5/8: No growth  Patient completed 2 weeks of cefepime treatment on 5/21/2024.  PICC line was subsequently removed.  Patient followed up with [facility] infectious disease on 5/31/2024 at which time patient was doing well.      Clinical presentation c/w septic versus hemorrhagic shock (POA).   Significant pyuria noted on urine analysis, urine culture 6/12-greater than 100,000 colonies of Klebsiella concerning for septic shock secondary to cystitis.  However patient has chronic indwelling Malin catheter which could lead to asymptomatic pyuria/bacteriuria.    GI bleed/significant drop in H&H on admission concerning for hemorrhagic shock.  GI evaluation appreciated.  H&H stable at this time.  Plans for IR guided embolization if further drop in H&H noted.    Off pressors.    Acute on chronic kidney injury: Prerenal versus ATN.  Improving    Altered mentation: Likely metabolic encephalopathy.     Recommendations:    Hold further antibiotics  Follow-up GI recommendations  Monitor H&H  Discharge planning per primary team    Will sign off.  Please call if any new questions or concerns.  Thanks    Above plan was discussed in details with patient, primary team.     HPI:    Does not answer all questions asked.  Detailed review of system not feasible     Past Medical History:   Diagnosis    Chemistry Recent Labs     06/19/24  0514   GLUCOSE 102*      K 3.6   *   CO2 18*   BUN 19*   CREATININE 1.29        CBC w/Diff Recent Labs     06/19/24  0514 06/19/24 2120 06/20/24  0455   WBC 10.8  --   --    RBC 2.80*  --   --    HGB 8.1* 8.2* 8.2*   HCT 25.3* 25.7* 26.3*     --   --         Microbiology Results       Procedure Component Value Units Date/Time    Culture, MRSA, Screening [[id number]] Collected: 06/13/24 1400    Order Status: Completed Specimen: Nares Updated: 06/14/24 2320     Special Requests NO SPECIAL REQUESTS        Culture MRSA NOT PRESENT               Screening of patient nares for MRSA is for surveillance purposes and, if positive, to facilitate isolation considerations in high risk settings. It is not intended for automatic decolonization interventions per se as regimens are not sufficiently effective to warrant routine use.      Culture, Urine [[id number]]  (Abnormal)  (Susceptibility) Collected: 06/12/24 1900    Order Status: Completed Specimen: Urine, clean catch Updated: 06/16/24 0928     Special Requests NO SPECIAL REQUESTS        Thoreau count --        >100,000  COLONIES/mL       Culture Klebsiella pneumoniae       Susceptibility        Klebsiella pneumoniae      BACTERIAL SUSCEPTIBILITY PANEL ROGER      amikacin <=2 ug/mL Sensitive      ampicillin 16 ug/mL Resistant      ampicillin-sulbactam <=2 ug/mL Sensitive      ceFAZolin <=4 ug/mL Sensitive      cefepime <=1 ug/mL Sensitive      cefOXitin <=4 ug/mL Sensitive      cefTAZidime <=1 ug/mL Sensitive      cefTRIAXone <=1 ug/mL Sensitive      ciprofloxacin <=0.25 ug/mL Sensitive      gentamicin <=1 ug/mL Sensitive      levofloxacin <=0.12 ug/mL Sensitive      meropenem <=0.25 ug/mL Sensitive      nitrofurantoin <=16 ug/mL Sensitive      piperacillin-tazobactam <=4 ug/mL Sensitive      tobramycin <=1 ug/mL Sensitive      trimethoprim-sulfamethoxazole <=20 ug/mL Sensitive                           COVID-19 &

## 2024-06-21 NOTE — DISCHARGE SUMMARY
Discharge Summary    Patient: Alycia Clement MRN: [id number]  CSN: [id number]    YOB: 1955  Age: 69 y.o.  Sex: male    DOA: 6/12/2024 LOS:  LOS: 9 days   Discharge Date:  6/21/2024      Admission Diagnosis: Septic shock (HCC) [A41.9, R65.21]    Discharge Diagnosis:  Shock  Septic shock  Complicated UTI  Hemorrhagic shock  Acute hypoxic respiratory failure  Chronic indwelling Malin  Acute GI bleed  Acute blood loss anemia on chronic anemia of chronic disease  Acute encephalopathy  Hyponatremia  Hypokalemia  Hyperchloremia  Hypophosphatemia  LORETTA on CKD 3  NG MA  BPH  History of hemorrhagic stroke with  shunt  Pseudohypoglycemia  Mild hypernatremia       Discharge Condition: Stable    Discharge Disposition: LTC    PHYSICAL EXAM    Visit Vitals  BP (!) 114/59   Pulse 83   Temp 97.6 °F (36.4 °C) (Axillary)   Resp 20   Ht 1.778 m (5' 10\")   Wt 77.6 kg (171 lb)   SpO2 99%   BMI 24.54 kg/m²       General: Alert, cooperative, no acute distress    HEENT: NC, Atraumatic.  PERRLA, EOMI. Anicteric sclerae.  Lungs:  CTA Bilaterally. No Wheezing/Rhonchi/Rales.  Heart:  Regular  rhythm,  No murmur, No Rubs, No Gallops  Abdomen: Soft, Non distended, Non tender.  +Bowel sounds, no HSM  Extremities: No c/c/e  Psych:   Not anxious or agitated.  Neurologic:  CN 2-12 grossly intact, oriented X 2.  No acute neurological deficits,     Hospital Course By Problem:   Patient presented to the emergency room with altered mentation.  Patient noted to be hypotensive tachycardic short of breath.  Multiple laboratory abnormalities noted including lactic acidemia/acidosis and low hemoglobin.  Patient received appropriate blood administration and due to low blood pressure required vasopressors and was admitted to ICU.  Patient was worked up in usual manner in the ED and ICU showing what appeared to be mixed shock including hemorrhagic and septic.  Patient was treated with appropriate treatments including blood  represent instrumentation or infectious process.    Diverticulosis without evidence of acute diverticulitis.    Distended rectum with large stool ball may represent impaction.    Cholelithiasis.          Electronically signed by Edgar Brown     MRI Result (most recent):  No results found for this or any previous visit from the past 3650 days.      Discharge Medications:        Medication List        START taking these medications      sodium bicarbonate 650 MG tablet  Take 1 tablet by mouth 3 times daily            CHANGE how you take these medications      pantoprazole 40 MG tablet  Commonly known as: PROTONIX  Take 1 tablet by mouth in the morning and at bedtime  What changed: when to take this            CONTINUE taking these medications      acetaminophen 500 MG tablet  Commonly known as: TYLENOL  Take 2 tablets by mouth 3 times daily as needed     ascorbic acid 500 MG tablet  Commonly known as: VITAMIN C  Take 1 tablet by mouth daily     Calcium Carb-Cholecalciferol 600-20 MG-MCG Chew  Take 1 tablet by mouth daily as needed     calcium carbonate 1250 (500 Ca) MG tablet  Commonly known as: OYSTER SHELL CALCIUM 500 mg  Take 1 tablet by mouth daily     DULoxetine 60 MG extended release capsule  Commonly known as: CYMBALTA  Take 1 capsule by mouth every evening     ferrous sulfate 325 (65 Fe) MG tablet  Commonly known as: IRON 325  Take 1 tablet by mouth daily     loperamide 2 MG capsule  Commonly known as: IMODIUM  Take 1 tablet by mouth every 6 hours as needed     loratadine 10 MG tablet  Commonly known as: CLARITIN  Take 1 tablet by mouth daily     menthol-zinc oxide 0.44-20.6 % Oint ointment  Commonly known as: CALMOSEPTINE  Apply to bilateral groin and buttocks daily as needed     Metamucil Smooth Texture 58.6 % powder  Generic drug: psyllium  Take 1 packet by mouth daily     midodrine 5 MG tablet  Commonly known as: PROAMATINE  Take 1 tablet by mouth in the morning and 1 tablet at noon and 1 tablet in

## 2024-06-21 NOTE — PROGRESS NOTES
0722: Bedside and Verbal shift change report given to Yusra RN (oncoming nurse) by Kym/BRITANY Alvarez (offgoing nurse). Report included the following information Nurse Handoff Report, Adult Overview, Intake/Output, MAR, Recent Results, and Cardiac Rhythm NSR .       1121: Discharge orders received.    [facility].     1821: Incontinent care performed. Report given to EMT pending d/c.     1830: Client D/C from facility.

## 2024-06-21 NOTE — PLAN OF CARE
Problem: Discharge Planning  Goal: Discharge to home or other facility with appropriate resources  Outcome: Progressing  Flowsheets (Taken 6/21/2024 0800)  Discharge to home or other facility with appropriate resources:   Identify barriers to discharge with patient and caregiver   Arrange for needed discharge resources and transportation as appropriate   Identify discharge learning needs (meds, wound care, etc)   Arrange for interpreters to assist at discharge as needed   Refer to discharge planning if patient needs post-hospital services based on physician order or complex needs related to functional status, cognitive ability or social support system     Problem: Safety - Adult  Goal: Free from fall injury  Outcome: Progressing     Problem: Pain  Goal: Verbalizes/displays adequate comfort level or baseline comfort level  Outcome: Progressing     Problem: ABCDS Injury Assessment  Goal: Absence of physical injury  Outcome: Progressing     Problem: Skin/Tissue Integrity  Goal: Absence of new skin breakdown  Outcome: Progressing     Problem: Nutrition Deficit:  Goal: Optimize nutritional status  Outcome: Progressing  Flowsheets (Taken 6/21/2024 0911 by Rima Trotter RD)  Nutrient intake appropriate for improving, restoring, or maintaining nutritional needs:   Assess nutritional status and recommend course of action   Monitor oral intake, labs, and treatment plans   Recommend appropriate diets, oral nutritional supplements, and vitamin/mineral supplements

## 2024-06-21 NOTE — CARE COORDINATION
SW spoke with Ms. Deleon to inform her that patient transport is set for 6 pm today and she is agreeable with transportation time. DC summary was sent via fax to [phone]. PCS form faxed to [facility] [phone].    Discharge order noted for today. Orders received. No other CM/SW needs identified at this time. Case management remains available as needed.      Kathia Becerra BSW   Case Management

## 2024-06-21 NOTE — CARE COORDINATION
JUAN spoke with Ms. Deleon via phone for [facility] [phone] and she stated patient can return. Patient can not return with IV's, wounds or isolation. Per RN Yusra patient do not have any wounds, no IV's, and patient is not on any isolation. The confirmed address is [address] [place] [address] phone # [phone].    JUAN called Jade Vasquez and Hung Vasquez to discuss patient dispo. No answer at [phone] for Jade. Hung at [phone] was notified and appreciative for the updated of patients dispo.      Kathia Becerra BSW   Case Management

## (undated) DEVICE — SYRINGE MED 50ML LUERSLIP TIP

## (undated) DEVICE — ENDOSCOPY PUMP TUBING/ CAP SET: Brand: ERBE

## (undated) DEVICE — BITE BLOCK ENDOSCP UNIV AD 6 TO 9.4 MM

## (undated) DEVICE — BASIN EMSIS 16OZ GRAPHITE PLAS KID SHP MOLD GRAD FOR ORAL

## (undated) DEVICE — LINER SUCT CANSTR 3000CC PLAS SFT PRE ASSEMB W/OUT TBNG W/

## (undated) DEVICE — SOLUTION IRRIG 1000ML H2O STRL BLT

## (undated) DEVICE — SYRINGE MED 25GA 3ML L5/8IN SUBQ PLAS W/ DETACH NDL SFTY

## (undated) DEVICE — UNDERPAD INCONT W23XL36IN STD BLU POLYPR BK FLUF SFT

## (undated) DEVICE — CANNULA NSL AD TBNG L14FT STD PVC O2 CRV CONN NONFLARED NSL

## (undated) DEVICE — MEDI-VAC NON-CONDUCTIVE SUCTION TUBING: Brand: CARDINAL HEALTH

## (undated) DEVICE — GAUZE,SPONGE,4"X4",16PLY,STRL,LF,10/TRAY: Brand: MEDLINE

## (undated) DEVICE — CATHETER SUCT TR FL TIP 14FR W/ O CTRL

## (undated) DEVICE — STERILE POLYISOPRENE POWDER-FREE SURGICAL GLOVES: Brand: PROTEXIS

## (undated) DEVICE — YANKAUER,SMOOTH HANDLE,HIGH CAPACITY: Brand: MEDLINE INDUSTRIES, INC.